# Patient Record
Sex: FEMALE | Race: WHITE | Employment: OTHER | URBAN - NONMETROPOLITAN AREA
[De-identification: names, ages, dates, MRNs, and addresses within clinical notes are randomized per-mention and may not be internally consistent; named-entity substitution may affect disease eponyms.]

---

## 2021-02-04 ENCOUNTER — HOSPITAL ENCOUNTER (INPATIENT)
Age: 76
LOS: 3 days | Discharge: HOME | DRG: 617 | End: 2021-02-07
Attending: PODIATRIST | Admitting: PODIATRIST
Payer: MEDICARE

## 2021-02-04 DIAGNOSIS — M79.662 PAIN AND SWELLING OF LEFT LOWER LEG: ICD-10-CM

## 2021-02-04 DIAGNOSIS — M79.89 PAIN AND SWELLING OF LEFT LOWER LEG: ICD-10-CM

## 2021-02-04 LAB
ANION GAP SERPL CALCULATED.3IONS-SCNC: 14 MEQ/L (ref 8–16)
BASOPHILS # BLD: 0.4 %
BASOPHILS ABSOLUTE: 0 THOU/MM3 (ref 0–0.1)
BUN BLDV-MCNC: 32 MG/DL (ref 7–22)
CALCIUM SERPL-MCNC: 8.9 MG/DL (ref 8.5–10.5)
CHLORIDE BLD-SCNC: 105 MEQ/L (ref 98–111)
CO2: 21 MEQ/L (ref 23–33)
CREAT SERPL-MCNC: 1.9 MG/DL (ref 0.4–1.2)
EOSINOPHIL # BLD: 4.7 %
EOSINOPHILS ABSOLUTE: 0.4 THOU/MM3 (ref 0–0.4)
ERYTHROCYTE [DISTWIDTH] IN BLOOD BY AUTOMATED COUNT: 13.8 % (ref 11.5–14.5)
ERYTHROCYTE [DISTWIDTH] IN BLOOD BY AUTOMATED COUNT: 47 FL (ref 35–45)
GFR SERPL CREATININE-BSD FRML MDRD: 26 ML/MIN/1.73M2
GLUCOSE BLD-MCNC: 114 MG/DL (ref 70–108)
HCT VFR BLD CALC: 34.9 % (ref 37–47)
HEMOGLOBIN: 10.5 GM/DL (ref 12–16)
IMMATURE GRANS (ABS): 0.02 THOU/MM3 (ref 0–0.07)
IMMATURE GRANULOCYTES: 0.2 %
LYMPHOCYTES # BLD: 27.1 %
LYMPHOCYTES ABSOLUTE: 2.4 THOU/MM3 (ref 1–4.8)
MCH RBC QN AUTO: 28.3 PG (ref 26–33)
MCHC RBC AUTO-ENTMCNC: 30.1 GM/DL (ref 32.2–35.5)
MCV RBC AUTO: 94.1 FL (ref 81–99)
MONOCYTES # BLD: 5.8 %
MONOCYTES ABSOLUTE: 0.5 THOU/MM3 (ref 0.4–1.3)
NUCLEATED RED BLOOD CELLS: 0 /100 WBC
PLATELET # BLD: 301 THOU/MM3 (ref 130–400)
PMV BLD AUTO: 10.9 FL (ref 9.4–12.4)
POTASSIUM REFLEX MAGNESIUM: 3.9 MEQ/L (ref 3.5–5.2)
RBC # BLD: 3.71 MILL/MM3 (ref 4.2–5.4)
SEG NEUTROPHILS: 61.8 %
SEGMENTED NEUTROPHILS ABSOLUTE COUNT: 5.5 THOU/MM3 (ref 1.8–7.7)
SODIUM BLD-SCNC: 140 MEQ/L (ref 135–145)
WBC # BLD: 8.9 THOU/MM3 (ref 4.8–10.8)

## 2021-02-04 PROCEDURE — 87205 SMEAR GRAM STAIN: CPT

## 2021-02-04 PROCEDURE — 36415 COLL VENOUS BLD VENIPUNCTURE: CPT

## 2021-02-04 PROCEDURE — 87070 CULTURE OTHR SPECIMN AEROBIC: CPT

## 2021-02-04 PROCEDURE — 83036 HEMOGLOBIN GLYCOSYLATED A1C: CPT

## 2021-02-04 PROCEDURE — 87075 CULTR BACTERIA EXCEPT BLOOD: CPT

## 2021-02-04 PROCEDURE — 80048 BASIC METABOLIC PNL TOTAL CA: CPT

## 2021-02-04 PROCEDURE — 85025 COMPLETE CBC W/AUTO DIFF WBC: CPT

## 2021-02-04 PROCEDURE — 1200000000 HC SEMI PRIVATE

## 2021-02-04 PROCEDURE — 87147 CULTURE TYPE IMMUNOLOGIC: CPT

## 2021-02-04 RX ORDER — OXYCODONE AND ACETAMINOPHEN 7.5; 325 MG/1; MG/1
1 TABLET ORAL 2 TIMES DAILY
COMMUNITY

## 2021-02-04 RX ORDER — DULOXETIN HYDROCHLORIDE 30 MG/1
30 CAPSULE, DELAYED RELEASE ORAL DAILY
COMMUNITY

## 2021-02-04 RX ORDER — POTASSIUM CHLORIDE 750 MG/1
10 CAPSULE, EXTENDED RELEASE ORAL DAILY
COMMUNITY

## 2021-02-04 RX ORDER — LANOLIN ALCOHOL/MO/W.PET/CERES
1000 CREAM (GRAM) TOPICAL DAILY
COMMUNITY

## 2021-02-04 RX ORDER — NICOTINE POLACRILEX 4 MG
15 LOZENGE BUCCAL PRN
Status: DISCONTINUED | OUTPATIENT
Start: 2021-02-04 | End: 2021-02-07 | Stop reason: HOSPADM

## 2021-02-04 RX ORDER — DEXTROSE MONOHYDRATE 50 MG/ML
100 INJECTION, SOLUTION INTRAVENOUS PRN
Status: DISCONTINUED | OUTPATIENT
Start: 2021-02-04 | End: 2021-02-07 | Stop reason: HOSPADM

## 2021-02-04 RX ORDER — GABAPENTIN 400 MG/1
400 CAPSULE ORAL 3 TIMES DAILY
COMMUNITY

## 2021-02-04 RX ORDER — GLIPIZIDE 5 MG/1
5 TABLET ORAL DAILY
COMMUNITY

## 2021-02-04 RX ORDER — POLYETHYLENE GLYCOL 3350 17 G/17G
17 POWDER, FOR SOLUTION ORAL DAILY PRN
Status: DISCONTINUED | OUTPATIENT
Start: 2021-02-04 | End: 2021-02-07 | Stop reason: HOSPADM

## 2021-02-04 RX ORDER — BENAZEPRIL HYDROCHLORIDE 20 MG/1
20 TABLET ORAL DAILY
COMMUNITY

## 2021-02-04 RX ORDER — HYDROXYZINE HYDROCHLORIDE 25 MG/1
25 TABLET, FILM COATED ORAL 3 TIMES DAILY PRN
COMMUNITY

## 2021-02-04 RX ORDER — SODIUM CHLORIDE 0.9 % (FLUSH) 0.9 %
10 SYRINGE (ML) INJECTION PRN
Status: DISCONTINUED | OUTPATIENT
Start: 2021-02-04 | End: 2021-02-07 | Stop reason: HOSPADM

## 2021-02-04 RX ORDER — SIMVASTATIN 40 MG
40 TABLET ORAL NIGHTLY
COMMUNITY

## 2021-02-04 RX ORDER — BUMETANIDE 1 MG/1
1 TABLET ORAL DAILY
COMMUNITY

## 2021-02-04 RX ORDER — ACETAMINOPHEN 650 MG/1
650 SUPPOSITORY RECTAL EVERY 6 HOURS PRN
Status: DISCONTINUED | OUTPATIENT
Start: 2021-02-04 | End: 2021-02-07 | Stop reason: HOSPADM

## 2021-02-04 RX ORDER — OMEPRAZOLE 40 MG/1
40 CAPSULE, DELAYED RELEASE ORAL DAILY
COMMUNITY

## 2021-02-04 RX ORDER — SODIUM CHLORIDE 0.9 % (FLUSH) 0.9 %
10 SYRINGE (ML) INJECTION EVERY 12 HOURS SCHEDULED
Status: DISCONTINUED | OUTPATIENT
Start: 2021-02-04 | End: 2021-02-07 | Stop reason: HOSPADM

## 2021-02-04 RX ORDER — ONDANSETRON 2 MG/ML
4 INJECTION INTRAMUSCULAR; INTRAVENOUS EVERY 6 HOURS PRN
Status: DISCONTINUED | OUTPATIENT
Start: 2021-02-04 | End: 2021-02-07 | Stop reason: HOSPADM

## 2021-02-04 RX ORDER — SODIUM CHLORIDE 9 MG/ML
INJECTION, SOLUTION INTRAVENOUS CONTINUOUS
Status: DISCONTINUED | OUTPATIENT
Start: 2021-02-04 | End: 2021-02-06

## 2021-02-04 RX ORDER — PROMETHAZINE HYDROCHLORIDE 25 MG/1
12.5 TABLET ORAL EVERY 6 HOURS PRN
Status: DISCONTINUED | OUTPATIENT
Start: 2021-02-04 | End: 2021-02-07 | Stop reason: HOSPADM

## 2021-02-04 RX ORDER — ALPRAZOLAM 0.25 MG/1
0.25 TABLET ORAL NIGHTLY PRN
COMMUNITY

## 2021-02-04 RX ORDER — ACETAMINOPHEN 325 MG/1
650 TABLET ORAL EVERY 6 HOURS PRN
Status: DISCONTINUED | OUTPATIENT
Start: 2021-02-04 | End: 2021-02-07 | Stop reason: HOSPADM

## 2021-02-04 RX ORDER — DEXTROSE MONOHYDRATE 25 G/50ML
12.5 INJECTION, SOLUTION INTRAVENOUS PRN
Status: DISCONTINUED | OUTPATIENT
Start: 2021-02-04 | End: 2021-02-07 | Stop reason: HOSPADM

## 2021-02-04 RX ORDER — CHOLECALCIFEROL (VITAMIN D3) 1250 MCG
CAPSULE ORAL
COMMUNITY

## 2021-02-04 SDOH — HEALTH STABILITY: MENTAL HEALTH: HOW OFTEN DO YOU HAVE A DRINK CONTAINING ALCOHOL?: NEVER

## 2021-02-05 ENCOUNTER — APPOINTMENT (OUTPATIENT)
Dept: INTERVENTIONAL RADIOLOGY/VASCULAR | Age: 76
DRG: 617 | End: 2021-02-05
Attending: PODIATRIST
Payer: MEDICARE

## 2021-02-05 ENCOUNTER — APPOINTMENT (OUTPATIENT)
Dept: GENERAL RADIOLOGY | Age: 76
DRG: 617 | End: 2021-02-05
Attending: PODIATRIST
Payer: MEDICARE

## 2021-02-05 ENCOUNTER — ANESTHESIA (OUTPATIENT)
Dept: OPERATING ROOM | Age: 76
DRG: 617 | End: 2021-02-05
Payer: MEDICARE

## 2021-02-05 ENCOUNTER — ANESTHESIA EVENT (OUTPATIENT)
Dept: OPERATING ROOM | Age: 76
DRG: 617 | End: 2021-02-05
Payer: MEDICARE

## 2021-02-05 VITALS — OXYGEN SATURATION: 98 % | SYSTOLIC BLOOD PRESSURE: 90 MMHG | DIASTOLIC BLOOD PRESSURE: 53 MMHG

## 2021-02-05 PROBLEM — L03.90 CELLULITIS: Status: ACTIVE | Noted: 2021-02-05

## 2021-02-05 LAB
ANION GAP SERPL CALCULATED.3IONS-SCNC: 13 MEQ/L (ref 8–16)
AVERAGE GLUCOSE: 105 MG/DL (ref 70–126)
BUN BLDV-MCNC: 33 MG/DL (ref 7–22)
CALCIUM SERPL-MCNC: 8.8 MG/DL (ref 8.5–10.5)
CHLORIDE BLD-SCNC: 106 MEQ/L (ref 98–111)
CO2: 24 MEQ/L (ref 23–33)
CREAT SERPL-MCNC: 2 MG/DL (ref 0.4–1.2)
EKG ATRIAL RATE: 77 BPM
EKG P AXIS: 72 DEGREES
EKG P-R INTERVAL: 170 MS
EKG Q-T INTERVAL: 378 MS
EKG QRS DURATION: 78 MS
EKG QTC CALCULATION (BAZETT): 427 MS
EKG R AXIS: 58 DEGREES
EKG T AXIS: 64 DEGREES
EKG VENTRICULAR RATE: 77 BPM
GFR SERPL CREATININE-BSD FRML MDRD: 24 ML/MIN/1.73M2
GLUCOSE BLD-MCNC: 104 MG/DL (ref 70–108)
GLUCOSE BLD-MCNC: 105 MG/DL (ref 70–108)
GLUCOSE BLD-MCNC: 107 MG/DL (ref 70–108)
GLUCOSE BLD-MCNC: 148 MG/DL (ref 70–108)
GLUCOSE BLD-MCNC: 166 MG/DL (ref 70–108)
HBA1C MFR BLD: 5.5 % (ref 4.4–6.4)
IRON: 29 UG/DL (ref 50–170)
POTASSIUM REFLEX MAGNESIUM: 4.1 MEQ/L (ref 3.5–5.2)
SARS-COV-2, NAAT: NOT DETECTED
SODIUM BLD-SCNC: 143 MEQ/L (ref 135–145)

## 2021-02-05 PROCEDURE — 83540 ASSAY OF IRON: CPT

## 2021-02-05 PROCEDURE — 0Y6S0Z0 DETACHMENT AT LEFT 2ND TOE, COMPLETE, OPEN APPROACH: ICD-10-PCS | Performed by: PODIATRIST

## 2021-02-05 PROCEDURE — 2500000003 HC RX 250 WO HCPCS: Performed by: NURSE ANESTHETIST, CERTIFIED REGISTERED

## 2021-02-05 PROCEDURE — 6360000002 HC RX W HCPCS: Performed by: STUDENT IN AN ORGANIZED HEALTH CARE EDUCATION/TRAINING PROGRAM

## 2021-02-05 PROCEDURE — 2580000003 HC RX 258: Performed by: STUDENT IN AN ORGANIZED HEALTH CARE EDUCATION/TRAINING PROGRAM

## 2021-02-05 PROCEDURE — 2720000010 HC SURG SUPPLY STERILE: Performed by: PODIATRIST

## 2021-02-05 PROCEDURE — 1200000000 HC SEMI PRIVATE

## 2021-02-05 PROCEDURE — 36415 COLL VENOUS BLD VENIPUNCTURE: CPT

## 2021-02-05 PROCEDURE — 71046 X-RAY EXAM CHEST 2 VIEWS: CPT

## 2021-02-05 PROCEDURE — 6360000002 HC RX W HCPCS: Performed by: NURSE ANESTHETIST, CERTIFIED REGISTERED

## 2021-02-05 PROCEDURE — U0002 COVID-19 LAB TEST NON-CDC: HCPCS

## 2021-02-05 PROCEDURE — 6370000000 HC RX 637 (ALT 250 FOR IP): Performed by: STUDENT IN AN ORGANIZED HEALTH CARE EDUCATION/TRAINING PROGRAM

## 2021-02-05 PROCEDURE — 3700000000 HC ANESTHESIA ATTENDED CARE: Performed by: PODIATRIST

## 2021-02-05 PROCEDURE — 2500000003 HC RX 250 WO HCPCS: Performed by: PODIATRIST

## 2021-02-05 PROCEDURE — 82948 REAGENT STRIP/BLOOD GLUCOSE: CPT

## 2021-02-05 PROCEDURE — 93971 EXTREMITY STUDY: CPT

## 2021-02-05 PROCEDURE — 3700000001 HC ADD 15 MINUTES (ANESTHESIA): Performed by: PODIATRIST

## 2021-02-05 PROCEDURE — 2709999900 HC NON-CHARGEABLE SUPPLY: Performed by: PODIATRIST

## 2021-02-05 PROCEDURE — 3600000013 HC SURGERY LEVEL 3 ADDTL 15MIN: Performed by: PODIATRIST

## 2021-02-05 PROCEDURE — 87147 CULTURE TYPE IMMUNOLOGIC: CPT

## 2021-02-05 PROCEDURE — 93005 ELECTROCARDIOGRAM TRACING: CPT | Performed by: STUDENT IN AN ORGANIZED HEALTH CARE EDUCATION/TRAINING PROGRAM

## 2021-02-05 PROCEDURE — 3600000003 HC SURGERY LEVEL 3 BASE: Performed by: PODIATRIST

## 2021-02-05 PROCEDURE — 87070 CULTURE OTHR SPECIMN AEROBIC: CPT

## 2021-02-05 PROCEDURE — 80048 BASIC METABOLIC PNL TOTAL CA: CPT

## 2021-02-05 PROCEDURE — 99221 1ST HOSP IP/OBS SF/LOW 40: CPT | Performed by: INTERNAL MEDICINE

## 2021-02-05 PROCEDURE — 93010 ELECTROCARDIOGRAM REPORT: CPT | Performed by: NUCLEAR MEDICINE

## 2021-02-05 PROCEDURE — 87075 CULTR BACTERIA EXCEPT BLOOD: CPT

## 2021-02-05 PROCEDURE — 87205 SMEAR GRAM STAIN: CPT

## 2021-02-05 RX ORDER — BUPIVACAINE HYDROCHLORIDE 5 MG/ML
INJECTION, SOLUTION EPIDURAL; INTRACAUDAL PRN
Status: DISCONTINUED | OUTPATIENT
Start: 2021-02-05 | End: 2021-02-05 | Stop reason: HOSPADM

## 2021-02-05 RX ORDER — SODIUM CHLORIDE 0.9 % (FLUSH) 0.9 %
10 SYRINGE (ML) INJECTION EVERY 12 HOURS SCHEDULED
Status: DISCONTINUED | OUTPATIENT
Start: 2021-02-05 | End: 2021-02-07 | Stop reason: HOSPADM

## 2021-02-05 RX ORDER — LIDOCAINE HYDROCHLORIDE 20 MG/ML
INJECTION, SOLUTION INFILTRATION; PERINEURAL PRN
Status: DISCONTINUED | OUTPATIENT
Start: 2021-02-05 | End: 2021-02-05 | Stop reason: SDUPTHER

## 2021-02-05 RX ORDER — OXYCODONE HYDROCHLORIDE 5 MG/1
5 TABLET ORAL EVERY 4 HOURS PRN
Status: DISCONTINUED | OUTPATIENT
Start: 2021-02-05 | End: 2021-02-07 | Stop reason: HOSPADM

## 2021-02-05 RX ORDER — SODIUM CHLORIDE 0.9 % (FLUSH) 0.9 %
10 SYRINGE (ML) INJECTION PRN
Status: DISCONTINUED | OUTPATIENT
Start: 2021-02-05 | End: 2021-02-07 | Stop reason: HOSPADM

## 2021-02-05 RX ORDER — LISINOPRIL 20 MG/1
20 TABLET ORAL DAILY
Status: DISCONTINUED | OUTPATIENT
Start: 2021-02-05 | End: 2021-02-07 | Stop reason: HOSPADM

## 2021-02-05 RX ORDER — PROPOFOL 10 MG/ML
INJECTION, EMULSION INTRAVENOUS PRN
Status: DISCONTINUED | OUTPATIENT
Start: 2021-02-05 | End: 2021-02-05 | Stop reason: SDUPTHER

## 2021-02-05 RX ORDER — SODIUM CHLORIDE 9 MG/ML
INJECTION, SOLUTION INTRAVENOUS CONTINUOUS
Status: DISCONTINUED | OUTPATIENT
Start: 2021-02-05 | End: 2021-02-06

## 2021-02-05 RX ORDER — OXYCODONE HYDROCHLORIDE 5 MG/1
10 TABLET ORAL EVERY 4 HOURS PRN
Status: DISCONTINUED | OUTPATIENT
Start: 2021-02-05 | End: 2021-02-07 | Stop reason: HOSPADM

## 2021-02-05 RX ORDER — PANTOPRAZOLE SODIUM 40 MG/1
40 TABLET, DELAYED RELEASE ORAL
Status: DISCONTINUED | OUTPATIENT
Start: 2021-02-06 | End: 2021-02-07 | Stop reason: HOSPADM

## 2021-02-05 RX ORDER — ATORVASTATIN CALCIUM 20 MG/1
20 TABLET, FILM COATED ORAL DAILY
Status: DISCONTINUED | OUTPATIENT
Start: 2021-02-05 | End: 2021-02-07 | Stop reason: HOSPADM

## 2021-02-05 RX ORDER — ALPRAZOLAM 0.25 MG/1
0.25 TABLET ORAL NIGHTLY PRN
Status: DISCONTINUED | OUTPATIENT
Start: 2021-02-05 | End: 2021-02-07 | Stop reason: HOSPADM

## 2021-02-05 RX ORDER — OXYCODONE AND ACETAMINOPHEN 7.5; 325 MG/1; MG/1
1 TABLET ORAL 2 TIMES DAILY
Status: DISCONTINUED | OUTPATIENT
Start: 2021-02-05 | End: 2021-02-07 | Stop reason: HOSPADM

## 2021-02-05 RX ORDER — FENTANYL CITRATE 50 UG/ML
INJECTION, SOLUTION INTRAMUSCULAR; INTRAVENOUS PRN
Status: DISCONTINUED | OUTPATIENT
Start: 2021-02-05 | End: 2021-02-05 | Stop reason: SDUPTHER

## 2021-02-05 RX ADMIN — LIDOCAINE HYDROCHLORIDE 40 MG: 20 INJECTION, SOLUTION INFILTRATION; PERINEURAL at 13:42

## 2021-02-05 RX ADMIN — OXYCODONE HYDROCHLORIDE AND ACETAMINOPHEN 1 TABLET: 7.5; 325 TABLET ORAL at 21:17

## 2021-02-05 RX ADMIN — BISACODYL 5 MG: 5 TABLET, COATED ORAL at 17:49

## 2021-02-05 RX ADMIN — SODIUM CHLORIDE: 9 INJECTION, SOLUTION INTRAVENOUS at 17:57

## 2021-02-05 RX ADMIN — PROPOFOL 10 MG: 10 INJECTION, EMULSION INTRAVENOUS at 13:49

## 2021-02-05 RX ADMIN — FENTANYL CITRATE 25 MCG: 50 INJECTION, SOLUTION INTRAMUSCULAR; INTRAVENOUS at 13:30

## 2021-02-05 RX ADMIN — PIPERACILLIN AND TAZOBACTAM 3375 MG: 3; .375 INJECTION, POWDER, LYOPHILIZED, FOR SOLUTION INTRAVENOUS at 17:49

## 2021-02-05 RX ADMIN — PROPOFOL 20 MG: 10 INJECTION, EMULSION INTRAVENOUS at 13:42

## 2021-02-05 RX ADMIN — PIPERACILLIN AND TAZOBACTAM 3375 MG: 3; .375 INJECTION, POWDER, LYOPHILIZED, FOR SOLUTION INTRAVENOUS at 09:31

## 2021-02-05 RX ADMIN — ALPRAZOLAM 0.25 MG: 0.25 TABLET ORAL at 21:17

## 2021-02-05 RX ADMIN — SODIUM CHLORIDE: 9 INJECTION, SOLUTION INTRAVENOUS at 13:30

## 2021-02-05 RX ADMIN — POLYETHYLENE GLYCOL 3350 17 G: 17 POWDER, FOR SOLUTION ORAL at 18:07

## 2021-02-05 RX ADMIN — SODIUM CHLORIDE: 9 INJECTION, SOLUTION INTRAVENOUS at 01:45

## 2021-02-05 RX ADMIN — ONDANSETRON HYDROCHLORIDE 4 MG: 4 INJECTION, SOLUTION INTRAMUSCULAR; INTRAVENOUS at 13:55

## 2021-02-05 RX ADMIN — PIPERACILLIN AND TAZOBACTAM 3375 MG: 3; .375 INJECTION, POWDER, LYOPHILIZED, FOR SOLUTION INTRAVENOUS at 01:41

## 2021-02-05 ASSESSMENT — PULMONARY FUNCTION TESTS
PIF_VALUE: 0
PIF_VALUE: 1
PIF_VALUE: 0

## 2021-02-05 ASSESSMENT — PAIN SCALES - GENERAL: PAINLEVEL_OUTOF10: 0

## 2021-02-05 NOTE — FLOWSHEET NOTE
Initial Spiritual Care Contact:      02/05/21 1510   Encounter Summary   Services provided to: Patient   Referral/Consult From: Sriram   Continue Visiting Yes  (2/5)   Complexity of Encounter Low   Length of Encounter 15 minutes   Spiritual/Mormonism   Type Spiritual support     Had pleasant conversation and prayer. Will have her toe amputated on Sunday at 2:00. Care Plan:  Continue spiritual and emotional care for patient and family. Including prayers.

## 2021-02-05 NOTE — CARE COORDINATION
DISASTER CHARTING    21, 11:53 AM EST    DISCHARGE ONGOING EVALUATION:     [facility] day: 1  Location: --A Reason for admit: Cellulitis [L03.90] Left foot second toe ulcer with osteomyelites and cellulites  Barriers to Discharge: Plan: Second digit amputation, left foot to 2021 in OR under MAC sedation pending surgical clearance  N. p.o. at midnight  Verify informed consent  Hold anticoagulants  Patient may weight-bear as tolerated in surgical shoe  Consult placed to infectious disease for antibiotic therapy, starting IV Zosyn  Aerobic and anaerobic culture taken, sent to micro  Consult placed to hospitalist for preoperative or stratification and medical management - cleared  PT and OT consult placed for gait training  Consult placed to case management for discharge planning  Podiatry will continue to follow this patient  Dr Paco Palomino noted plan for Continue IV Zosyn and transition to oral antibiotics. PCP: Angelia Espinoza  Readmission Risk Score: 11%  Patient Goals/Plan/Treatment Preferences: I spoke with Kana Martinez. She is planning to go home with at discharge. Her   a month ago. She has a dog. She has needed equipment for home use. She would be open to [facility] if ordered. She would like information to order home delivered meals - SW consulted.

## 2021-02-05 NOTE — CARE COORDINATION
2/5/21, 3:49 PM EST    DISCHARGE PLANNING EVALUATION      Patient in surgery today. She is requesting information on home delivered meals. Information and contact numbers for meals on wheels program in [address] [place] [address] left in patient's room for patient to follow up.

## 2021-02-05 NOTE — H&P
Department of Podiatric Surgery  History and Physical        CHIEF COMPLAINT: Left second toe infection    Reason for Admission: Left second toe infection, preop    History Obtained From:  patient, family member -children    HISTORY OF PRESENT ILLNESS:      The patient is a 67 y.o. female with significant past medical history of diabetes, hypertension, CKD who was seen by Dr. Dalila Cantu today in clinic. Patient is accompanied by her children. Patient relates that she has had a wound on her second toe for approximately 1 month. She has been neuropathic for a very long time. She denies any fever, chills, nausea, vomiting, chest pain or shortness of breath. Patient does have some discomfort to the left leg. She denies being on any blood thinners but does sit often. They had discussed with Dr. Dalila Cantu in clinic that she has infection in her bone and the best course of action would be a digit amputation under MAC sedation. Patient and family all agree to the treatment. Past Medical History:        Diagnosis Date    Arthritis     Blood circulation, collateral     Legs    Chronic kidney disease     Diabetes mellitus [address] [address]     Hypertension      Past Surgical History:        Procedure Laterality Date    JOINT REPLACEMENT       Immunizations:              Tetanus:  unknown              Medications Prior to Admission:   Medications Prior to Admission: potassium chloride (MICRO-K) 10 MEQ extended release capsule, Take 10 mEq by mouth daily  bumetanide (BUMEX) 1 MG tablet, Take 1 mg by mouth daily  benazepril (LOTENSIN) 20 MG tablet, Take 20 mg by mouth daily  gabapentin (NEURONTIN) 400 MG capsule, Take 400 mg by mouth 3 times daily. glipiZIDE (GLUCOTROL) 5 MG tablet, Take 5 mg by mouth daily Takes at lunch  metFORMIN (GLUCOPHAGE) 500 MG tablet, Take 500 mg by mouth daily Takes at lunch  ALPRAZolam (XANAX) 0.25 MG tablet, Take 0.25 mg by mouth nightly as needed for Sleep. simvastatin (ZOCOR) 40 MG tablet, Take 40 mg by mouth nightly  omeprazole (PRILOSEC) 40 MG delayed release capsule, Take 40 mg by mouth daily  oxyCODONE-acetaminophen (PERCOCET) 7.5-325 MG per tablet, Take 1 tablet by mouth 2 times daily. DULoxetine (CYMBALTA) 30 MG extended release capsule, Take 30 mg by mouth daily  hydrOXYzine (ATARAX) 25 MG tablet, Take 25 mg by mouth 3 times daily as needed for Itching (Sleep)  vitamin B-12 (CYANOCOBALAMIN) 1000 MCG tablet, Take 1,000 mcg by mouth daily  Cholecalciferol (VITAMIN D3) 1.25 MG (93126 UT) CAPS, Take by mouth    Allergies:  Patient has no known allergies. Social History:   TOBACCO:   reports that she has never smoked. She has never used smokeless tobacco.  ETOH:   reports no history of alcohol use. Family History:   History reviewed. No pertinent family history. REVIEW OF SYSTEMS:  CONSTITUTIONAL:  negative    PHYSICAL EXAM:  VITALS:  BP (!) 127/59   Pulse 84   Temp 98.6 °F (37 °C) (Oral)   Resp 16   Ht 5' 5\" (1.651 m)   Wt 198 lb (89.8 kg)   SpO2 97%   BMI 32.95 kg/m²   CONSTITUTIONAL:  awake, alert, cooperative, no apparent distress, and appears stated age  EYES:  pupils equal, round and reactive to light, extra ocular muscles intact, sclera clear, conjunctiva normal  ENT:  normocepalic, without obvious abnormality  LUNGS:  No increased work of breathing, good air exchange, clear to auscultation bilaterally, no crackles or wheezing  CARDIOVASCULAR:  Normal apical impulse, regular rate and rhythm      LOWER EXTREMITY:  Physical Exam:   Vascular: Dorsalis pedis and posterior tibial pulses are very easily palpable bilaterally. Skin temperature is warm to warm from proximal tibial tuberosity to distal digits left greater than right. CFT brisk to exposed digits. Edema present and nonpitting. Hair growth absent.  Quality of skin within normal limits Dermatologic: Nails 1-5 bilaterally are thickened, elongated and dystrophic, with presence of subungual debris. Webspaces 1-4 bilaterally are clean, dry and intact. Hyperkeratotic wounds noted to the distal tip of the second digit of the left foot. There is a small ulceration in this area that has a fibrogranular base that probes to bone. There is serous drainage noted. No malodor. There is diffuse erythema and dactylitis noted to the digit. Arnulfo Smith Neurovascular: Epicritic and protopathic sensation diminished bilateral.    Musculoskeletal: Muscle strength 5/5 for all plantarflexors, dorsiflexors, inverters and everters examined. Negative pain on palpation to second digit. Second digit is hammered to the left side with distal clavus. IMAGING:  X-rays reviewed in office. LABS: Pending    ASSESSMENT AND PLAN:  1. Diabetic wound with osteomyelitis, second digit left foot  -Patient was initially examined and evaluated  -Ordered PA T's: CBC, BMP, hemoglobin A1c, chest x-ray, EKG  Discussed with patient and family members at length about surgical procedure for second digit amputation of the left foot. Patient understands all risks and benefits associated with the procedure including but not limited to dehiscence, further infection, more proximal amputation. Patient understands and wishes to proceed. N.p.o. at midnight  Verify informed consent  Hold anticoagulants  Patient may weight-bear as tolerated in surgical shoe  Consult placed to infectious disease for antibiotic therapy, starting IV Zosyn  Aerobic and anaerobic culture taken, sent to micro  Consult placed to hospitalist for preoperative or stratification and medical management  PT and OT consult placed for gait training  Consult placed to case management for discharge planning  Podiatry will continue to follow this patient    2.   Diabetes mellitus  Continue home medications  Ordered hemoglobin A1c  Consult placed to hospitalist

## 2021-02-05 NOTE — PLAN OF CARE
Problem: Falls - Risk of:  Goal: Will remain free from falls  Description: Will remain free from falls  Outcome: Ongoing  Note: Patient using call light appropriately to call for assistance. Patient is compliant with use of non-skid slippers. Patient reports understanding of fall prevention when discussed. Bed alarm remains in place. Call light is in reach. Goal: Absence of physical injury  Description: Absence of physical injury  Outcome: Ongoing  Note: Patient remains free from physical injury at this time. Problem: Discharge Planning:  Goal: Discharged to appropriate level of care  Description: Discharged to appropriate level of care  Outcome: Ongoing  Note: Patient plans to return home at discharge.  consulted for discharge planning. Problem: Pain:  Goal: Pain level will decrease  Description: Pain level will decrease  Outcome: Ongoing  Note: Patient denies any pain at this time. Will continue to monitor. Goal: Control of acute pain  Description: Control of acute pain  Outcome: Ongoing  Goal: Control of chronic pain  Description: Control of chronic pain  Outcome: Ongoing    Care plan reviewed with patient and family. Patient and family verbalize understanding of the plan of care and contribute to goal setting.

## 2021-02-05 NOTE — ANESTHESIA POSTPROCEDURE EVALUATION
Department of Anesthesiology  Postprocedure Note    Patient: Aguilar Rosa  MRN: [id number]  YOB: 1945  Date of evaluation: 2/5/2021  Time:  2:40 PM     Procedure Summary     Date: 02/05/21 Room / [facility]: [facility]  / [facility]    Anesthesia Start: 1330 Anesthesia Stop: 9601    Procedure: LEFT SECOND TOE AMPUTATION (Left Toes) Diagnosis: (Osteomyelitis)    Surgeons: Dwayne Mann, DPLESTER Responsible Provider: Bhavesh Bryan MD    Anesthesia Type: MAC ASA Status: 3          Anesthesia Type: MAC    Erica Phase I:      Erica Phase II:      Last vitals: Reviewed and per EMR flowsheets.        Anesthesia Post Evaluation    Patient location during evaluation: bedside  Patient participation: complete - patient participated  Level of consciousness: awake  Airway patency: patent  Nausea & Vomiting: no vomiting and no nausea  Complications: no  Cardiovascular status: hemodynamically stable  Respiratory status: acceptable  Hydration status: stable

## 2021-02-05 NOTE — PROGRESS NOTES
[facility]  PHYSICAL THERAPY MISSED TREATMENT NOTE  [facility] ORTHOPEDICS 7K    Date: 2021  Patient Name: Petra Bueno        MRN: [id number]   : 1945  (76 y.o.)  Gender: female                REASON FOR MISSED TREATMENT:  Pt is scheduled for L 2nd toe amputation this afternoon. Will hold PT until after surgery.  Lisy Drafts, PT, DPT

## 2021-02-05 NOTE — CONSULTS
CONSULTATION NOTE :ID       Patient - Veronica Massey,  Age - 67 y.o.    - 1945      Room Number - 7K-13/013-A   MRN -  [id number]   [facility]  Date of Admission -  2021  9:22 PM  Patient's PCP: Ruth Garay     Requesting Physician: Vinh Donahue., DPM    REASON FOR CONSULTATION   Left foot second toe ulcer with cellulites  CHIEF COMPLAINT   Non healing wound    HISTORY OF PRESENT ILLNESS       This is a very pleasant 67 y.o. female who was admitted to the hospital with a chief complaints of non healing left foot second toe ulcer. She is diabetic with neuropathy. Had an ulcer for the last 4 wks. She was seen by her podiatrist and was noted to have an ulcer on the left second toe with redenss. The ulcer probes to the bone and the toe was red swollen. She was admitted for iv antibiotic and surgery. She has no hx of PVD. Has CKD. There was redness on the dorsum of the left foot    PAST MEDICAL  HISTORY       Past Medical History:   Diagnosis Date    Arthritis     Blood circulation, collateral     Legs    Chronic kidney disease     Diabetes mellitus ([address]     Hypertension        PAST SURGICAL HISTORY     Past Surgical History:   Procedure Laterality Date    JOINT REPLACEMENT           MEDICATIONS:       Scheduled Meds:   [Held by provider] lisinopril  20 mg Oral Daily    [START ON 2021] pantoprazole  40 mg Oral QAM AC    atorvastatin  20 mg Oral Daily    oxyCODONE-acetaminophen  1 tablet Oral BID    sodium chloride flush  10 mL Intravenous 2 times per day    piperacillin-tazobactam  3,375 mg Intravenous Q8H     Continuous Infusions:   sodium chloride Stopped (21 0550)    dextrose       PRN Meds:ALPRAZolam, sodium chloride flush, promethazine **OR** ondansetron, polyethylene glycol, acetaminophen **OR** acetaminophen, glucose, dextrose, glucagon (rDNA), dextrose  Allergies:   ALLERGIES:    Patient has no known allergies. SOCIAL HISTORY:     TOBACCO:   reports that she has never smoked. She has never used smokeless tobacco.     ETOH:   reports no history of alcohol use. Patient currently lives alone      FAMILY HISTORY:     History reviewed. No pertinent family history. REVIEW OF SYSTEMS:     Constitutional: no fever, no night sweats, no fatigue, no weight loss. Head: no head ache , no head injury, no migranes. Eye: no eye discharge, blurring of vision, no double vision,no eye pain. Ears: no hearing difficulty, no tinnitus  Mouth/throat: no ulceration, dental caries , dysphagia, no hoarseness and voice change  Respiratory: no cough no chest pain,no shortness of breath,no wheezing  CVS: no palpitation, no chest pain,   GI: no abdominal pain, no nausea , no vomiting, no constipation,no diarrhea. REY: no dysuria, frequency and urgency, no hematuria, no kidney stones  Musculoskeletal: as noted in HPI. Endocrine: no polyuria, polydipsia, no cold or heat intolerance  Hematology: no anemia, no easy brusing or bleeding, no hx of clotting disorder  Dermatology: no skin rash, no skin lesions, no pruritis,  Neurological:no headaches,no dizziness, no seizure, no numbness. Psychiatry: no depression, no anxiety,no panic attacks, no suicide ideation    PHYSICAL EXAM:     BP (!) 107/56   Pulse 67   Temp 97.8 °F (36.6 °C) (Oral)   Resp 16   Ht 5' 5\" (1.651 m)   Wt 198 lb (89.8 kg)   SpO2 97%   BMI 32.95 kg/m²   General apperance:  Awake, alert, not in distress. HEENT: pink conjunctiva, unicteric sclera, moist oral mucosa. Chest:  Bilateral air entry  Cardiovascular:  RRR ,S1S2, no murmur or gallop. Abdomen:  Soft, non tender to palpation. Extremities:  She has open wound on the left second toe with redness and swelling. The pulse palpable, there is redness on the dorsum of the foot  Skin:  Warm and dry. CNS:oriented to person place and time.         LABS:     CBC:   Recent Labs     02/04/21  2300   WBC 8.9   HGB 10.5*    BMP:    Recent Labs     02/04/21  2300 02/05/21  0618    143   K 3.9 4.1    106   CO2 21* 24   BUN 32* 33*   CREATININE 1.9* 2.0*   GLUCOSE 114* 104     Calcium:  Recent Labs     02/05/21  0618   CALCIUM 8.8    Glucose:  Recent Labs     02/05/21  0548   POCGLU 148*     HgbA1C:   Recent Labs     02/04/21  2300   LABA1C 5.5         Problem list of patient      Patient Active Problem List   Diagnosis Code    Cellulitis L03.90    Chronic kidney disease N18.9    Diabetes mellitus ([facility] [address] E11.9    Hypertension I10           Impression and Recommendation:   Left foot second toe ulcer with osteomyelites and cellulites  Continue iv zosyn. Need amputation of the toe. Will transition to oral antibiotic to address the associated cellulites  DM with neuropathy   Thank you Gary Patel, NLOA for allowing me to participate in this patient's care.     Jonnathan Vaughan MD,FACP 2/5/2021 9:36 AM

## 2021-02-05 NOTE — ANESTHESIA PRE PROCEDURE
Department of Anesthesiology  Preprocedure Note       Name:  Lennon Sandhoff   Age:  67 y.o.  :  1945                                          MRN:  [id number]         Date:  2021      Surgeon: Leona Carter):  Tonny Moritz., DPM    Procedure: Procedure(s):  LEFT SECOND TOE AMPUTATION    Medications prior to admission:   Prior to Admission medications    Medication Sig Start Date End Date Taking? Authorizing Provider   potassium chloride (MICRO-K) 10 MEQ extended release capsule Take 10 mEq by mouth daily   Yes Historical Provider, MD   bumetanide (BUMEX) 1 MG tablet Take 1 mg by mouth daily   Yes Historical Provider, MD   benazepril (LOTENSIN) 20 MG tablet Take 20 mg by mouth daily   Yes Historical Provider, MD   gabapentin (NEURONTIN) 400 MG capsule Take 400 mg by mouth 3 times daily. Yes Historical Provider, MD   glipiZIDE (GLUCOTROL) 5 MG tablet Take 5 mg by mouth daily Takes at lunch   Yes Historical Provider, MD   metFORMIN (GLUCOPHAGE) 500 MG tablet Take 500 mg by mouth daily Takes at lunch   Yes Historical Provider, MD   ALPRAZolam (XANAX) 0.25 MG tablet Take 0.25 mg by mouth nightly as needed for Sleep. Yes Historical Provider, MD   simvastatin (ZOCOR) 40 MG tablet Take 40 mg by mouth nightly   Yes Historical Provider, MD   omeprazole (PRILOSEC) 40 MG delayed release capsule Take 40 mg by mouth daily   Yes Historical Provider, MD   oxyCODONE-acetaminophen (PERCOCET) 7.5-325 MG per tablet Take 1 tablet by mouth 2 times daily.     Yes Historical Provider, MD   DULoxetine (CYMBALTA) 30 MG extended release capsule Take 30 mg by mouth daily   Yes Historical Provider, MD   hydrOXYzine (ATARAX) 25 MG tablet Take 25 mg by mouth 3 times daily as needed for Itching (Sleep)    Historical Provider, MD   vitamin B-12 (CYANOCOBALAMIN) 1000 MCG tablet Take 1,000 mcg by mouth daily    Historical Provider, MD   Cholecalciferol (VITAMIN D3) 1.25 MG (48438 UT) CAPS Take by mouth    Historical Provider, MD Current medications:    Current Facility-Administered Medications   Medication Dose Route Frequency Provider Last Rate Last Admin    ALPRAZolam [place]) tablet 0.25 mg  0.25 mg Oral Nightly PRN Holley Krabbe, MD        [Held by provider] lisinopril (PRINIVIL;ZESTRIL) tablet 20 mg  20 mg Oral Daily Holley Krabbe, MD        [place] ON 2/6/2021] pantoprazole (PROTONIX) tablet 40 mg  40 mg Oral QAM AC Holley Krabbe, MD        atorvastatin (LIPITOR) tablet 20 mg  20 mg Oral Daily Holley Krabbe, MD        oxyCODONE-acetaminophen (PERCOCET) 7.5-325 MG per tablet 1 tablet  1 tablet Oral BID Holley Krabbe, MD        0.9 % sodium chloride infusion   Intravenous Continuous Huan Ez DPLESTER   Stopped at 02/05/21 0550    sodium chloride flush 0.9 % injection 10 mL  10 mL Intravenous 2 times per day Huan Ez DPLESTER        sodium chloride flush 0.9 % injection 10 mL  10 mL Intravenous PRN Huan Ez, DPM        promethazine (PHENERGAN) tablet 12.5 mg  12.5 mg Oral Q6H PRN Huan Ez, DPM        Or    ondansetron (ZOFRAN) injection 4 mg  4 mg Intravenous Q6H PRN Huna Belindak, DPM        polyethylene glycol (GLYCOLAX) packet 17 g  17 g Oral Daily PRN Huan Ez, DPM        acetaminophen (TYLENOL) tablet 650 mg  650 mg Oral Q6H PRN Huan Reek, DPM        Or    acetaminophen (TYLENOL) suppository 650 mg  650 mg Rectal Q6H PRN Huan Belindak, DPM        piperacillin-tazobactam (ZOSYN) 3,375 mg in dextrose 5 % 50 mL IVPB extended infusion (mini-bag)  3,375 mg Intravenous Q8H Amelia Franklin Lynch DPM 12.5 mL/hr at 02/05/21 0931 3,375 mg at 02/05/21 0931    glucose (GLUTOSE) 40 % oral gel 15 g  15 g Oral PRN Amelia Spoon Cris, DPM        dextrose 50 % IV solution  12.5 g Intravenous PRN Huan Ez, DPM        glucagon (rDNA) injection 1 mg  1 mg Intramuscular PRN Huan Reek, DPM  dextrose 5 % solution  100 mL/hr Intravenous PRN Leonid Vallejo DPM           Allergies:  No Known Allergies    Problem List:    Patient Active Problem List   Diagnosis Code    Cellulitis L03.90    Chronic kidney disease N18.9    Diabetes mellitus ([facility] [address] E11.9    Hypertension I10       Past Medical History:        Diagnosis Date    Arthritis     Blood circulation, collateral     Legs    Chronic kidney disease     Diabetes mellitus ([facility] [address]     Hypertension        Past Surgical History:        Procedure Laterality Date    JOINT REPLACEMENT         Social History:    Social History     Tobacco Use    Smoking status: Never Smoker    Smokeless tobacco: Never Used   Substance Use Topics    Alcohol use: Never     Frequency: Never                                Counseling given: Not Answered      Vital Signs (Current):   Vitals:    02/04/21 2157 02/05/21 0145 02/05/21 0921   BP: (!) 127/59 (!) 114/58 (!) 107/56   Pulse: 84 87 67   Resp: 16 16 16   Temp: 98.6 °F (37 °C) 98.1 °F (36.7 °C) 97.8 °F (36.6 °C)   TempSrc: Oral Oral Oral   SpO2: 97% 95% 97%   Weight: 198 lb (89.8 kg)     Height: 5' 5\" (1.651 m)                                                BP Readings from Last 3 Encounters:   02/05/21 (!) 107/56   02/05/21 (!) 115/58       NPO Status:                                                                                 BMI:   Wt Readings from Last 3 Encounters:   02/04/21 198 lb (89.8 kg)     Body mass index is 32.95 kg/m².     CBC:   Lab Results   Component Value Date    WBC 8.9 02/04/2021    RBC 3.71 02/04/2021    HGB 10.5 02/04/2021    HCT 34.9 02/04/2021    MCV 94.1 02/04/2021     02/04/2021       CMP:   Lab Results   Component Value Date     02/05/2021    K 4.1 02/05/2021     02/05/2021    CO2 24 02/05/2021    BUN 33 02/05/2021    CREATININE 2.0 02/05/2021    LABGLOM 24 02/05/2021    GLUCOSE 104 02/05/2021    CALCIUM 8.8 02/05/2021       POC Tests:   Recent Labs     02/05/21 1124   POCGLU 107       Coags: No results found for: PROTIME, INR, APTT    HCG (If Applicable): No results found for: PREGTESTUR, PREGSERUM, HCG, HCGQUANT     ABGs: No results found for: PHART, PO2ART, GXF9CFV, EKA9TCH, BEART, D8TNOPVP     Type & Screen (If Applicable):  No results found for: LABABO, LABRH    Drug/Infectious Status (If Applicable):  No results found for: HIV, HEPCAB    COVID-19 Screening (If Applicable):   Lab Results   Component Value Date    COVID19 NOT DETECTED 02/05/2021         Anesthesia Evaluation  Patient summary reviewed and Nursing notes reviewed no history of anesthetic complications:   Airway: Mallampati: III  TM distance: >3 FB   Neck ROM: full  Mouth opening: > = 3 FB Dental:          Pulmonary:Negative Pulmonary ROS and normal exam  breath sounds clear to auscultation                             Cardiovascular:  Exercise tolerance: poor (<4 METS),   (+) hypertension:,       ECG reviewed                        Neuro/Psych:   Negative Neuro/Psych ROS              GI/Hepatic/Renal:   (+) renal disease: CRI,           Endo/Other:    (+) DiabetesType II DM, poorly controlled, , .          Pt had no PAT visit       Abdominal:   (+) obese,     Abdomen: soft. Vascular: negative vascular ROS. Anesthesia Plan      MAC     ASA 3       Induction: intravenous. Anesthetic plan and risks discussed with patient. Plan discussed with CRNA.                   Nubia Castillo DO   2/5/2021

## 2021-02-05 NOTE — CONSULTS
Consult History & Physical      Patient:  Elizabeth Kearney  YOB: 1945    MRN: [id number]     Acct: [de-identified]      Chief Complaint:  Infected toe    Date of Service: Pt seen/examined in consultation on 2/5/2021      History Of Present Illness:      67 y.o. female who we are asked to see/evaluate by Vanetta Gilford.NOLA for medical management of diabetes, hypertension. The pt has a one month hx of a toe which became red and became infected. She saw Dr Alen Bowman in the office and was admitted with the intent of amputation. She has been diabetic x 4 yr. She has had peripheral neuropathy many years. She has recently been advised she has kidney problems and is scheduled to see a nephrologist.  She denies hx of heart problems and denies sob or chest pain. Past Medical History:        Diagnosis Date    Arthritis     Blood circulation, collateral     Legs    Chronic kidney disease     Diabetes mellitus ([facility] [address]     Hypertension        Past Surgical History:        Procedure Laterality Date    JOINT REPLACEMENT         Medications Prior to Admission:    Prior to Admission medications    Medication Sig Start Date End Date Taking? Authorizing Provider   potassium chloride (MICRO-K) 10 MEQ extended release capsule Take 10 mEq by mouth daily   Yes Historical Provider, MD   bumetanide (BUMEX) 1 MG tablet Take 1 mg by mouth daily   Yes Historical Provider, MD   benazepril (LOTENSIN) 20 MG tablet Take 20 mg by mouth daily   Yes Historical Provider, MD   gabapentin (NEURONTIN) 400 MG capsule Take 400 mg by mouth 3 times daily. Yes Historical Provider, MD   glipiZIDE (GLUCOTROL) 5 MG tablet Take 5 mg by mouth daily Takes at lunch   Yes Historical Provider, MD   metFORMIN (GLUCOPHAGE) 500 MG tablet Take 500 mg by mouth daily Takes at lunch   Yes Historical Provider, MD   ALPRAZolam (XANAX) 0.25 MG tablet Take 0.25 mg by mouth nightly as needed for Sleep.    Yes Historical Provider, MD simvastatin (ZOCOR) 40 MG tablet Take 40 mg by mouth nightly   Yes Historical Provider, MD   omeprazole (PRILOSEC) 40 MG delayed release capsule Take 40 mg by mouth daily   Yes Historical Provider, MD   oxyCODONE-acetaminophen (PERCOCET) 7.5-325 MG per tablet Take 1 tablet by mouth 2 times daily. Yes Historical Provider, MD   DULoxetine (CYMBALTA) 30 MG extended release capsule Take 30 mg by mouth daily   Yes Historical Provider, MD   hydrOXYzine (ATARAX) 25 MG tablet Take 25 mg by mouth 3 times daily as needed for Itching (Sleep)    Historical Provider, MD   vitamin B-12 (CYANOCOBALAMIN) 1000 MCG tablet Take 1,000 mcg by mouth daily    Historical Provider, MD   Cholecalciferol (VITAMIN D3) 1.25 MG (90946 UT) CAPS Take by mouth    Historical Provider, MD       Allergies:  Patient has no known allergies. Social History:      The patient currently lives alone. She recently lost her . TOBACCO:   reports that she has never smoked. She has never used smokeless tobacco.  ETOH:   reports no history of alcohol use. Family History:     Positive as follows:    History reviewed. No pertinent family history. Diet:  Diet NPO, After Midnight    REVIEW OF SYSTEMS:   Pertinent positives as noted in the HPI. All other systems reviewed and negative. PHYSICAL EXAM:  BP (!) 114/58   Pulse 87   Temp 98.1 °F (36.7 °C) (Oral)   Resp 16   Ht 5' 5\" (1.651 m)   Wt 198 lb (89.8 kg)   SpO2 95%   BMI 32.95 kg/m²   General appearance: No apparent distress, appears stated age and cooperative. HEENT: Normal cephalic, atraumatic without obvious deformity. Pupils equal, round, and reactive to light. Extra ocular muscles intact. Conjunctivae/corneas clear. Neck: Supple, with full range of motion. No jugular venous distention. Trachea midline. Respiratory:  Normal respiratory effort. Clear to auscultation, bilaterally without Rales/Wheezes/Rhonchi. Cardiovascular: Regular rate and rhythm with normal S1/S2 without murmurs, rubs or gallops. Abdomen: Soft, non-tender, non-distended with normal bowel sounds. Musculoskeletal:left foot wrapped  Skin: Skin color, texture, turgor normal.  No rashes or lesions. Neurologic:  Diminished sensation soles of feet  Psychiatric: Alert and oriented, thought content appropriate, normal insight  Capillary Refill: Brisk,< 3 seconds   Peripheral Pulses: +2 palpable right; left wrapped  Labs:     Recent Labs     02/04/21  2300   WBC 8.9   HGB 10.5*   HCT 34.9*        Recent Labs     02/04/21  2300 02/05/21  0618    143   K 3.9 4.1    106   CO2 21* 24   BUN 32* 33*   CREATININE 1.9* 2.0*   CALCIUM 8.9 8.8     No results for input(s): AST, ALT, BILIDIR, BILITOT, ALKPHOS in the last 72 hours. No results for input(s): INR in the last 72 hours. No results for input(s): Coralyn Whitlock in the last 72 hours. Urinalysis:  No results found for: [employer], BACTERIA, RBCUA, BLOODU, [address]    Radiology: I have reviewed the radiology reports with the following interpretation:     VL DUP LOWER EXTREMITY VENOUS LEFT   Final Result   Normal venous ultrasound. No evidence for acute deep venous thrombosis. **This report has been created using voice recognition software. It may contain minor errors which are inherent in voice recognition technology. **      Final report electronically signed by Dr. Robyn Gonzales on 2/5/2021 8:03 AM      XR CHEST (2 VW)   Final Result   Impression:   1. No acute cardiopulmonary disease.       This document has been electronically signed by: Elva Hodgkins, MD on    02/05/2021 08:27 AM                EKG:  I have reviewed the EKG with the following interpretation:    Nsr, normal    DVT prophylaxis: to be determined    Code Status: Full Code    PT/OT Eval Status: Active and ongoing      ASSESSMENT:    Active Hospital Problems    Diagnosis Date Noted  Cellulitis [L03.90] 02/05/2021    Chronic kidney disease [N18.9]     Diabetes mellitus ([facility] [address] [E11.9]     Hypertension [I10]        PLAN:    1. This pt is stable and is a satisfactory surgical risk for planned procedure. 2. Avoid nephrotoxic agents as much as possible. Thank you for the consultation.     Electronically signed by Aggie Sosa MD on 2/5/2021 at 9:05 AM

## 2021-02-05 NOTE — H&P
[address] [place] [address]                              HISTORY AND PHYSICAL    PATIENT NAME: Anton Askew                     :        1945  MED REC NO:   [id number]                           ROOM:       0013  ACCOUNT NO:   [de-identified]                           ADMIT DATE: 2021  PROVIDER:     Bridget Rosenberg. BART Vuong    HISTORY OF PRESENT ILLNESS:  The patient came into my office in [facility]  yesterday about 3 o'clock with chief complaint of a painful, red,  swollen second toe, left foot. Upon examination, the second toe, left  foot, had an ulcer in the distal end of it which was extremely foul  smelling, followed by a hallux bunion with hallux interphalangeus. The  IM angle was 14 degrees, the hallux abductus angle was 28 degrees, and  hallux interphalangeus angle or otherwise known as HI angle was 24  degrees and that was underlapping the second toe which was elevated and  the third toe was underlapping the second toe as well. So, the second  toe is elevated. It has got an ulcer on the distal end. It is pressing  against the toenail, lateral side of the hallux and that is starting to  become macerated and looking like it is going to become infected and  ulcerate. So upon palpation, the second toe ulcer probes to bone, and  x-rays give high suspicion of osteomyelitis. The patient was advised to  direct admit to [facility] as soon as possible, so that  we could amputate her second toe because otherwise to try to fix the  hallux interphalangeus, hallux abductovalgus, and overall bunion  deformity in order to have some place to put the second toe, which is  already infected, would be a lot more surgery than a 58-year-old woman  can tolerate.   So, on top of that, we would have to put fixation in and  with osteomyelitis already present in the toe, it just was not worth the risk to benefit ratio. So, she is here for an amputation, second toe to  the MPJ and hopefully, we will be able to close that today. Consent has  been explained, signed, and witnessed. The patient is aware of risk,  benefits, and complications. She is on IV antibiotics, but she does  have kidney stage 3 disease and we have to watch the antibiotics that we  give her. INDENTATION:  Consent has been signed for an amputation, second toe. SHANNEN JENSEN D.P.M.    D: 02/05/2021 13:38:41       T: 02/05/2021 15:02:41     [email or web address]  Job#: [id number]     Doc#: [id number]    CC:

## 2021-02-06 PROBLEM — M86.172 ACUTE OSTEOMYELITIS OF TOE OF LEFT FOOT (HCC): Status: ACTIVE | Noted: 2021-02-06

## 2021-02-06 LAB
ANION GAP SERPL CALCULATED.3IONS-SCNC: 6 MEQ/L (ref 8–16)
BUN BLDV-MCNC: 29 MG/DL (ref 7–22)
CALCIUM SERPL-MCNC: 8.1 MG/DL (ref 8.5–10.5)
CHLORIDE BLD-SCNC: 112 MEQ/L (ref 98–111)
CO2: 23 MEQ/L (ref 23–33)
CREAT SERPL-MCNC: 1.7 MG/DL (ref 0.4–1.2)
GFR SERPL CREATININE-BSD FRML MDRD: 29 ML/MIN/1.73M2
GLUCOSE BLD-MCNC: 137 MG/DL (ref 70–108)
GLUCOSE BLD-MCNC: 141 MG/DL (ref 70–108)
GLUCOSE BLD-MCNC: 178 MG/DL (ref 70–108)
GLUCOSE BLD-MCNC: 199 MG/DL (ref 70–108)
POTASSIUM REFLEX MAGNESIUM: 4.1 MEQ/L (ref 3.5–5.2)
POTASSIUM SERPL-SCNC: 4.1 MEQ/L (ref 3.5–5.2)
SODIUM BLD-SCNC: 141 MEQ/L (ref 135–145)

## 2021-02-06 PROCEDURE — 99232 SBSQ HOSP IP/OBS MODERATE 35: CPT | Performed by: INTERNAL MEDICINE

## 2021-02-06 PROCEDURE — 94760 N-INVAS EAR/PLS OXIMETRY 1: CPT

## 2021-02-06 PROCEDURE — 1200000000 HC SEMI PRIVATE

## 2021-02-06 PROCEDURE — 2580000003 HC RX 258: Performed by: STUDENT IN AN ORGANIZED HEALTH CARE EDUCATION/TRAINING PROGRAM

## 2021-02-06 PROCEDURE — 80048 BASIC METABOLIC PNL TOTAL CA: CPT

## 2021-02-06 PROCEDURE — 6370000000 HC RX 637 (ALT 250 FOR IP): Performed by: STUDENT IN AN ORGANIZED HEALTH CARE EDUCATION/TRAINING PROGRAM

## 2021-02-06 PROCEDURE — 82948 REAGENT STRIP/BLOOD GLUCOSE: CPT

## 2021-02-06 PROCEDURE — 36415 COLL VENOUS BLD VENIPUNCTURE: CPT

## 2021-02-06 PROCEDURE — 6360000002 HC RX W HCPCS: Performed by: STUDENT IN AN ORGANIZED HEALTH CARE EDUCATION/TRAINING PROGRAM

## 2021-02-06 RX ADMIN — PIPERACILLIN AND TAZOBACTAM 3375 MG: 3; .375 INJECTION, POWDER, LYOPHILIZED, FOR SOLUTION INTRAVENOUS at 01:20

## 2021-02-06 RX ADMIN — PIPERACILLIN AND TAZOBACTAM 3375 MG: 3; .375 INJECTION, POWDER, LYOPHILIZED, FOR SOLUTION INTRAVENOUS at 16:02

## 2021-02-06 RX ADMIN — SODIUM CHLORIDE: 9 INJECTION, SOLUTION INTRAVENOUS at 02:33

## 2021-02-06 RX ADMIN — SODIUM CHLORIDE, PRESERVATIVE FREE 10 ML: 5 INJECTION INTRAVENOUS at 21:54

## 2021-02-06 RX ADMIN — OXYCODONE HYDROCHLORIDE AND ACETAMINOPHEN 1 TABLET: 7.5; 325 TABLET ORAL at 09:35

## 2021-02-06 RX ADMIN — SODIUM CHLORIDE, PRESERVATIVE FREE 10 ML: 5 INJECTION INTRAVENOUS at 09:37

## 2021-02-06 RX ADMIN — ATORVASTATIN CALCIUM 20 MG: 20 TABLET, FILM COATED ORAL at 09:34

## 2021-02-06 RX ADMIN — PANTOPRAZOLE SODIUM 40 MG: 40 TABLET, DELAYED RELEASE ORAL at 06:06

## 2021-02-06 RX ADMIN — SODIUM CHLORIDE, PRESERVATIVE FREE 10 ML: 5 INJECTION INTRAVENOUS at 10:46

## 2021-02-06 RX ADMIN — BISACODYL 5 MG: 5 TABLET, COATED ORAL at 09:34

## 2021-02-06 RX ADMIN — OXYCODONE HYDROCHLORIDE AND ACETAMINOPHEN 1 TABLET: 7.5; 325 TABLET ORAL at 21:54

## 2021-02-06 RX ADMIN — PIPERACILLIN AND TAZOBACTAM 3375 MG: 3; .375 INJECTION, POWDER, LYOPHILIZED, FOR SOLUTION INTRAVENOUS at 09:45

## 2021-02-06 ASSESSMENT — PAIN DESCRIPTION - LOCATION: LOCATION: GENERALIZED

## 2021-02-06 ASSESSMENT — PAIN DESCRIPTION - FREQUENCY: FREQUENCY: INTERMITTENT

## 2021-02-06 ASSESSMENT — PAIN SCALES - GENERAL: PAINLEVEL_OUTOF10: 0

## 2021-02-06 ASSESSMENT — PAIN DESCRIPTION - DESCRIPTORS: DESCRIPTORS: ACHING

## 2021-02-06 NOTE — PROGRESS NOTES
Progress note: Infectious diseases    Patient - Consuelo Woodson,  Age - 67 y.o.    - 1945      Room Number - 7K-13/013-A   MRN -  [id number]   Acct # - [de-identified]  Date of Admission -  2021  9:22 PM    SUBJECTIVE:   She has no new complaints. Discussed with podiatry  OBJECTIVE   VITALS    height is 5' 5\" (1.651 m) and weight is 198 lb (89.8 kg). Her oral temperature is 97.4 °F (36.3 °C). Her blood pressure is 130/58 (abnormal) and her pulse is 80. Her respiration is 16 and oxygen saturation is 95%.        Wt Readings from Last 3 Encounters:   21 198 lb (89.8 kg)       I/O (24 Hours)    Intake/Output Summary (Last 24 hours) at 2021 1149  Last data filed at 2021 7614  Gross per 24 hour   Intake 1630.21 ml   Output    Net 1630.21 ml       General Appearance  Awake, alert, oriented,  not  In acute distress  HEENT - normocephalic, atraumatic, pink conjunctiva,  anicteric sclera  Neck - Supple, no mass  Lungs -  Bilateral   air entry, no rhonchi, no wheeze  Cardiovascular - Heart sounds are normal.    Abdomen - soft, not distended, nontender,   Neurologic -oriented  Skin - No bruising or bleeding  Extremities - dressed left foot          MEDICATIONS:      [Held by provider] lisinopril  20 mg Oral Daily    pantoprazole  40 mg Oral QAM AC    atorvastatin  20 mg Oral Daily    oxyCODONE-acetaminophen  1 tablet Oral BID    sodium chloride flush  10 mL Intravenous 2 times per day    bisacodyl  5 mg Oral Daily    sodium chloride flush  10 mL Intravenous 2 times per day    piperacillin-tazobactam  3,375 mg Intravenous Q8H      dextrose       ALPRAZolam, sodium chloride flush, oxyCODONE **OR** oxyCODONE, sodium chloride flush, promethazine **OR** ondansetron, polyethylene glycol, acetaminophen **OR** acetaminophen, glucose, dextrose, glucagon (rDNA), dextrose      LABS:     CBC:   Recent Labs     21 2300   WBC 8.9   HGB 10.5*        BMP:    Recent Labs     02/04/21  2300 02/05/21  0618 02/06/21  0546    143 141   K 3.9 4.1 4.1  4.1    106 112*   CO2 21* 24 23   BUN 32* 33* 29*   CREATININE 1.9* 2.0* 1.7*   GLUCOSE 114* 104 137*     Calcium:  Recent Labs     02/06/21  0546   CALCIUM 8.1*      Recent Labs     02/05/21 2006 02/06/21  0614 02/06/21  1039   POCGLU 166* 141* 199*     HgbA1C:   Recent Labs     02/04/21  2300   LABA1C 5.5        CULTURES:   UA: No results for input(s): SPECGRAV, PHUR, COLORU, CLARITYU, MUCUS, PROTEINU, BLOODU, RBCUA, WBCUA, BACTERIA, NITRU, GLUCOSEU, BILIRUBINUR, UROBILINOGEN, KETUA, LABCAST, LABCASTTY, AMORPHOS in the last 72 hours. Invalid input(s): CRYSTALS  Micro: No results found for: BC       Problem list of patient:     Patient Active Problem List   Diagnosis Code    Cellulitis L03.90    Chronic kidney disease N18.9    Diabetes mellitus ([facility] [address] E11.9    Hypertension I10    Acute osteomyelitis of toe of left foot ([facility] [address] M86.172         ASSESSMENT/PLAN   Left foot OM with cellulites: she had resection of the toe. Continue current antibiotic. Will follow the cx report  She wants to go home tomorrow.       Missael Eden MD, Ragini Spencer 2/6/2021 11:49 AM

## 2021-02-06 NOTE — PROGRESS NOTES
Pt back from surgery via bed. Call light placed within reach. 0.9 infusing at 125 ml/hr with approx 300 ml to count. No apple or drains noted. See flowsheet for further info.

## 2021-02-06 NOTE — OP NOTE
[address] [place] [address]                                OPERATIVE REPORT    PATIENT NAME: Gorden Dancer                     :        1945  MED REC NO:   [id number]                           ROOM:       0013  ACCOUNT NO:   [de-identified]                           ADMIT DATE: 2021  PROVIDER:     Lo Vuong D.P.M. DATE OF PROCEDURE:  2021    PREOPERATIVE DIAGNOSES:  Osteomyelitis, second toe, left foot;  hammertoe, second digit, left foot; ulcer distal end, second toe, left  foot; overlapping second toe, left foot; hallux interphalangeus and  hallux abductovalgus, left foot; hammertoes, third and fourth digits,  left foot. DIAGNOSES:  Osteomyelitis, second toe, left foot; hammertoe, second  digit, left foot; ulcer distal end, second toe, left foot; overlapping  second toe, left foot; hallux interphalangeus and hallux abductovalgus,  left foot; hammertoes, third and fourth digits, left foot. Pending the microbiology of the second toe amputated. PROCEDURE PERFORMED:  Amputation of second toe with primary closure at  the metatarsophalangeal head. SURGEON:  Lo Vuong DPM    ASSISTANT:  Ulanda Shone. SECOND ASSISTANT:  Geraldine Peter. OPERATIVE PROCEDURE:  The patient was taken to the operating room,  identified with the time-out. The foot had been previously marked in  the waiting area. Foot was prepped and draped in usual aseptic manner. Attention was then directed to the second digit, left foot where a two  encompassing incisions were made starting at the top of the foot and  circling down around and we went ahead and totally circumscribed the  second toe, with care to identify and retract vital structures. Superficial bleeders were clamped and bovied or clamped and ligated as  deemed necessary.   We then went ahead and amputated the entire second toe and submitted to Pathology/Microbiology for aerobic and anaerobic  cultures. The patient at this time was enclosed with a 3-0 nylon and dressed with  Adaptic, Betadine, 4 x 4, Kerlix, stockinette, Ace wrap, and she has a  postop shoe from our office. The patient at this time will be held until we have the microbiology  until Dr. Laila Moralez can go ahead and tell us what antibiotic she needs to  be on. Once we have that, then we will go ahead and proceed to  discharge her. SHANNEN JENSEN D.P.M.    D: 02/05/2021 14:15:09       T: 02/05/2021 15:25:32     [email or web address]  Job#: [id number]     Doc#: [id number]    CC:

## 2021-02-06 NOTE — PROGRESS NOTES
Assessment and Plan:        1. Osteomyelitis toe; amputated 2.5. Culture pending. Dr Raines Pean following. 2. ckd- creatinine improved today  3. Diabetes mellitus- sugars under control even without meds today. Her GFR too low for metformin. A1C only 5.5    CC:  Sore toe  HPI: pt with dm, ckd presents with swollen toe of several weeks duration. Underwent amputation 2.5 for osteo  ROS (12 point review of systems completed. Pertinent positives noted. Otherwise ROS is negative) :   PMH:  Per HPI  SHX:  Recent .  nonsmoker  FHX: Noncontributory    Allergies: See above    Medications:     dextrose        [Held by provider] lisinopril  20 mg Oral Daily    pantoprazole  40 mg Oral QAM AC    atorvastatin  20 mg Oral Daily    oxyCODONE-acetaminophen  1 tablet Oral BID    sodium chloride flush  10 mL Intravenous 2 times per day    bisacodyl  5 mg Oral Daily    sodium chloride flush  10 mL Intravenous 2 times per day    piperacillin-tazobactam  3,375 mg Intravenous Q8H       Vital Signs:   BP (!) 121/58   Pulse 84   Temp 97.4 °F (36.3 °C) (Oral)   Resp 16   Ht 5' 5\" (1.651 m)   Wt 198 lb (89.8 kg)   SpO2 94%   BMI 32.95 kg/m²      Intake/Output Summary (Last 24 hours) at 2/6/2021 2317  Last data filed at 2/6/2021 4021  Gross per 24 hour   Intake 1430.21 ml   Output    Net 1430.21 ml        Physical Examination: General appearance - alert, well appearing, and in no distress  Mental status - alert, oriented to person, place, and time  Neck - supple, no significant adenopathy, no JVD, or carotid bruits  Chest - clear to auscultation, no wheezes, rales or rhonchi, symmetric air entry  Heart - normal rate, regular rhythm, normal S1, S2, no murmurs, rubs, clicks or gallops  Abdomen - soft, nontender, nondistended, no masses or organomegaly  Neurological - diminished sensation feet  Musculoskeletal - foot wrapped  Extremities - no pedal edema noted, pulse 2+ right, left wrapped Skin - normal coloration and turgor, no rashes, no suspicious skin lesions noted    Data: (All radiographs, tracings, PFTs, and imaging are personally viewed and interpreted unless otherwise noted). ?  Reviewed labs      Electronically signed by Mickle Sandifer, MD on 2/6/2021 at 6:24 AM

## 2021-02-07 VITALS
HEART RATE: 83 BPM | OXYGEN SATURATION: 96 % | RESPIRATION RATE: 16 BRPM | SYSTOLIC BLOOD PRESSURE: 143 MMHG | HEIGHT: 65 IN | TEMPERATURE: 98.6 F | DIASTOLIC BLOOD PRESSURE: 67 MMHG | WEIGHT: 198 LBS | BODY MASS INDEX: 32.99 KG/M2

## 2021-02-07 LAB
ANION GAP SERPL CALCULATED.3IONS-SCNC: 7 MEQ/L (ref 8–16)
BUN BLDV-MCNC: 33 MG/DL (ref 7–22)
CALCIUM SERPL-MCNC: 8.6 MG/DL (ref 8.5–10.5)
CHLORIDE BLD-SCNC: 111 MEQ/L (ref 98–111)
CO2: 23 MEQ/L (ref 23–33)
CREAT SERPL-MCNC: 1.9 MG/DL (ref 0.4–1.2)
GFR SERPL CREATININE-BSD FRML MDRD: 26 ML/MIN/1.73M2
GLUCOSE BLD-MCNC: 130 MG/DL (ref 70–108)
GLUCOSE BLD-MCNC: 140 MG/DL (ref 70–108)
GLUCOSE BLD-MCNC: 173 MG/DL (ref 70–108)
POTASSIUM REFLEX MAGNESIUM: 4.5 MEQ/L (ref 3.5–5.2)
SODIUM BLD-SCNC: 141 MEQ/L (ref 135–145)

## 2021-02-07 PROCEDURE — 2580000003 HC RX 258: Performed by: STUDENT IN AN ORGANIZED HEALTH CARE EDUCATION/TRAINING PROGRAM

## 2021-02-07 PROCEDURE — 80048 BASIC METABOLIC PNL TOTAL CA: CPT

## 2021-02-07 PROCEDURE — 97530 THERAPEUTIC ACTIVITIES: CPT

## 2021-02-07 PROCEDURE — 6360000002 HC RX W HCPCS: Performed by: STUDENT IN AN ORGANIZED HEALTH CARE EDUCATION/TRAINING PROGRAM

## 2021-02-07 PROCEDURE — 97535 SELF CARE MNGMENT TRAINING: CPT

## 2021-02-07 PROCEDURE — 6370000000 HC RX 637 (ALT 250 FOR IP): Performed by: STUDENT IN AN ORGANIZED HEALTH CARE EDUCATION/TRAINING PROGRAM

## 2021-02-07 PROCEDURE — 36415 COLL VENOUS BLD VENIPUNCTURE: CPT

## 2021-02-07 PROCEDURE — 94760 N-INVAS EAR/PLS OXIMETRY 1: CPT

## 2021-02-07 PROCEDURE — 99231 SBSQ HOSP IP/OBS SF/LOW 25: CPT | Performed by: INTERNAL MEDICINE

## 2021-02-07 PROCEDURE — 82948 REAGENT STRIP/BLOOD GLUCOSE: CPT

## 2021-02-07 PROCEDURE — 97165 OT EVAL LOW COMPLEX 30 MIN: CPT

## 2021-02-07 RX ORDER — CLINDAMYCIN HYDROCHLORIDE 300 MG/1
300 CAPSULE ORAL 3 TIMES DAILY
Qty: 15 CAPSULE | Refills: 0 | Status: SHIPPED | OUTPATIENT
Start: 2021-02-07 | End: 2021-02-12

## 2021-02-07 RX ADMIN — BISACODYL 5 MG: 5 TABLET, COATED ORAL at 09:09

## 2021-02-07 RX ADMIN — SODIUM CHLORIDE, PRESERVATIVE FREE 10 ML: 5 INJECTION INTRAVENOUS at 09:09

## 2021-02-07 RX ADMIN — PANTOPRAZOLE SODIUM 40 MG: 40 TABLET, DELAYED RELEASE ORAL at 09:08

## 2021-02-07 RX ADMIN — PIPERACILLIN AND TAZOBACTAM 3375 MG: 3; .375 INJECTION, POWDER, LYOPHILIZED, FOR SOLUTION INTRAVENOUS at 10:15

## 2021-02-07 RX ADMIN — OXYCODONE HYDROCHLORIDE AND ACETAMINOPHEN 1 TABLET: 7.5; 325 TABLET ORAL at 09:08

## 2021-02-07 RX ADMIN — PIPERACILLIN AND TAZOBACTAM 3375 MG: 3; .375 INJECTION, POWDER, LYOPHILIZED, FOR SOLUTION INTRAVENOUS at 00:50

## 2021-02-07 RX ADMIN — SODIUM CHLORIDE, PRESERVATIVE FREE 10 ML: 5 INJECTION INTRAVENOUS at 10:16

## 2021-02-07 ASSESSMENT — PAIN DESCRIPTION - LOCATION
LOCATION: GENERALIZED
LOCATION: BACK

## 2021-02-07 ASSESSMENT — PAIN DESCRIPTION - PROGRESSION: CLINICAL_PROGRESSION: GRADUALLY IMPROVING

## 2021-02-07 ASSESSMENT — PAIN - FUNCTIONAL ASSESSMENT: PAIN_FUNCTIONAL_ASSESSMENT: PREVENTS OR INTERFERES SOME ACTIVE ACTIVITIES AND ADLS

## 2021-02-07 ASSESSMENT — PAIN DESCRIPTION - DESCRIPTORS: DESCRIPTORS: ACHING

## 2021-02-07 ASSESSMENT — PAIN DESCRIPTION - PAIN TYPE
TYPE: CHRONIC PAIN
TYPE: CHRONIC PAIN

## 2021-02-07 ASSESSMENT — PAIN DESCRIPTION - ONSET: ONSET: ON-GOING

## 2021-02-07 NOTE — PROGRESS NOTES
[address] 59  INPATIENT OCCUPATIONAL THERAPY  [facility] ORTHOPEDICS 7K  EVALUATION    Time:   Time In: 745  Time Out: 845  Timed Code Treatment Minutes: 40 Minutes  Minutes: 60          Date: 2021  Patient Name: Lashonda Jauregui,   Gender: female      MRN: [id number]  : 1945  (67 y.o.)  Referring Practitioner: Jennifer Wylie DPM  Diagnosis: acute osteomyelitis of L toe with amputation. Additional Pertinent Hx: per / H & P note; The patient is a 67 y.o. female with significant past medical history of diabetes, hypertension, CKD who was seen by Dr. Kirstie Albert today in clinic. Patient is accompanied by her children. Patient relates that she has had a wound on her second toe for approximately 1 month. She has been neuropathic for a very long time. She denies any fever, chills, nausea, vomiting, chest pain or shortness of breath. Patient does have some discomfort to the left leg. She denies being on any blood thinners but does sit often. They had discussed with Dr. Kirstie Albert in clinic that she has infection in her bone and the best course of action would be a digit amputation under MAC sedation. Patient and family all agree to the treatment. Restrictions/Precautions:  Restrictions/Precautions: Weight Bearing  Right Lower Extremity Weight Bearing: Partial Weight Bearing    Subjective  Chart Reviewed: Yes, Orders, Progress Notes, History and Physical  Patient assessed for rehabilitation services?: Yes  Family / Caregiver Present: No    Subjective: RN approved session. Patient supine in bed upon OT arrival. patient agreeable to evalution. A & O x 3.     Pain:  Pain Assessment  Pain Assessment: 0-10  Pain Level: 5  Pain Location: Back    Social/Functional History:  Lives With: Alone  Type of Home: House  Home Layout: One level  Home Access: Stairs to enter with rails  Entrance Stairs - Number of Steps: 3  Entrance Stairs - Rails: Left  Home Equipment: Rolling walker Bathroom Shower/Tub: Tub/Shower unit  Bathroom Toilet: Standard  Bathroom Equipment: 3-in-1 commode, Shower chair  Bathroom Accessibility: Accessible    Receives Help From: Family  ADL Assistance: Independent  Homemaking Assistance: Independent  Ambulation Assistance: Independent  Transfer Assistance: Independent    Active : Yes     Additional Comments: owns a dog    VISION:WFL    HEARING:  WFL    COGNITION: Decreased Recall    RANGE OF MOTION:  Right Upper Extremity: Impaired - AROM to approx 90 degrees with facial grimacing  Left Upper Extremity:  Impaired - AROM to approx 90 degrees with facial grimacing     STRENGTH:  Right Upper Extremity: Impaired - shldr flex 4-/5 ext 4/5 elbow flex and ext 4+/5  (F)  Left Upper Extremity:  Impaired - shldr flex  and ext 4/5 elbow flex and ext 4+/5  (P+)    SENSATION:   WFL    ADL:   Grooming: Stand By Assistance. standing at sink with cues for 2 w/w placement good compliance with L LE precautions   Bathing: Minimal Assistance. for back, SBA for rest of body including standing for thorough rakan area bathing   Upper Extremity Dressing: Minimal Assistance. for hospital gown  Lower Extremity Dressing: Minimal Assistance. difficulty getting clothing started over feet, increased time to doff/don R sock with SBA  Toileting: Stand By Assistance. with good compliance with L LE precautions  Toilet Transfer: [address] cues for hand placement to lower self onto toilet. would occasionally require MIN A to stand from toilet with grab bars. BALANCE:  Sitting Balance:  Independent. no difficulty during dynamic sitting tasks  Standing Balance: Contact Guard Assistance. with use of 2 w/w    BED MOBILITY:  Supine to Sit: Modified Independent with use of bed rails and increased time    TRANSFERS:  Sit to Stand:  Contact Guard Assistance.  cues for hand placement, sequencing and L LE precautions    FUNCTIONAL MOBILITY:  Assistive Device: Luis Armando Hernández Assist Level:  Contact Guard Assistance. Distance: To and from bathroom  Would lean forearms on 2 w/w when functionally ambulating. Exercise:  none completed this date    Activity Tolerance:  Patient tolerance of  treatment: good. Patient able to stand for prolonged periods of time for grooming and bathing tasks with good compliance for L LE precautions with use of 2 w/w for support. Assessment:  Assessment: Patient motivated to participate in treatment and tolerated tasks well. demosntrates good compliance with L LEprecautions. would benefit from continued therapy to increase overall endurance and ease and (I) with ADLs and functional transfers. Performance deficits / Impairments: Decreased ADL status, Decreased posture, Decreased endurance, Decreased strength  Prognosis: Good  REQUIRES OT FOLLOW UP: Yes  Decision Making: Low Complexity    Treatment Initiated: Treatment and education initiated within context of evaluation. Evaluation time included review of current medical information, gathering information related to past medical, social and functional history, completion of standardized testing, formal and informal observation of tasks, assessment of data and development of plan of care and goals. Treatment time included skilled education and facilitation of tasks to increase safety and independence with ADL's for improved functional independence and quality of life.     Discharge Recommendations:  Continue to assess pending progress, Home with Home health OT    Patient Education:  OT Education: OT Role, Plan of Care, ADL Adaptive Strategies, Transfer Training, Precautions  Barriers to Learning: none    Equipment Recommendations:       Plan:  Times per week: 5 x  Times per day: Daily Current Treatment Recommendations: Strengthening, Endurance Training, Neuromuscular Re-education, Patient/Caregiver Education & Training, Self-Care / ADL. See long-term goal time frame for expected duration of plan of care. If no long-term goals established, a short length of stay is anticipated. Goals:  Patient goals : return [facility]  Short term goals  Time Frame for Short term goals: by discharge  Short term goal 1: Patient will complete LB dressing with (I) with use of AE PRN  Short term goal 2: Patient will tolerate 10 min functional standing within L LE precautons with MOD I to increase ease with grooming and toileting routine. Short term goal 3: Patient will increase (B)  to (F+) to increase ease with clothing mgmt. Short term goal 4: Patient will complete functional transfers with MOD I with L LE precautions. Following session, patient left in safe position with all fall risk precautions in place.

## 2021-02-07 NOTE — PROGRESS NOTES
Department of Podiatric Surgery  History and Physical        CHIEF COMPLAINT: Left second toe infection    Reason for Admission: Left second toe infection, preop    History Obtained From:  patient, family member -children    2/7/21  Patient is 58-year-old female with significant past medical history of DM, HTN, CKD who is being seen at bedside on behalf of Dr. Davenport Officer. Patient states she is ready to discharge home today. She denies pain, fever, chills, nausea, vomiting, chest pain or shortness of breath at this time. 2/6/21  Patient is 58-year-old female with significant past medical history of DM, HTN, CKD who is being seen bedside with Dr. Davenport Officer. Patient denies pain, fever, chills, nausea, vomiting, chest pain or shortness of breath at this time. (Please see note completed by Dr. Davenport Officer on 2/6/2021)    2/5/21 HPI  The patient is a 67 y.o. female with significant past medical history of diabetes, hypertension, CKD who was seen by Dr. Ethel Bullard today in clinic. Patient is accompanied by her children. Patient relates that she has had a wound on her second toe for approximately 1 month. She has been neuropathic for a very long time. She denies any fever, chills, nausea, vomiting, chest pain or shortness of breath. Patient does have some discomfort to the left leg. She denies being on any blood thinners but does sit often. They had discussed with Dr. Ethel Bullard in clinic that she has infection in her bone and the best course of action would be a digit amputation under MAC sedation. Patient and family all agree to the treatment.     Past Medical History:        Diagnosis Date    Acute osteomyelitis of toe of left foot ([address] 2/6/2021    Arthritis     Blood circulation, collateral     Legs    Chronic kidney disease     Diabetes mellitus ([address]     Hypertension      Past Surgical History:        Procedure Laterality Date    JOINT REPLACEMENT       Immunizations:              Tetanus:  unknown Medications Prior to Admission:   Medications Prior to Admission: potassium chloride (MICRO-K) 10 MEQ extended release capsule, Take 10 mEq by mouth daily  bumetanide (BUMEX) 1 MG tablet, Take 1 mg by mouth daily  benazepril (LOTENSIN) 20 MG tablet, Take 20 mg by mouth daily  gabapentin (NEURONTIN) 400 MG capsule, Take 400 mg by mouth 3 times daily. glipiZIDE (GLUCOTROL) 5 MG tablet, Take 5 mg by mouth daily Takes at lunch  metFORMIN (GLUCOPHAGE) 500 MG tablet, Take 500 mg by mouth daily Takes at lunch  ALPRAZolam (XANAX) 0.25 MG tablet, Take 0.25 mg by mouth nightly as needed for Sleep.  simvastatin (ZOCOR) 40 MG tablet, Take 40 mg by mouth nightly  omeprazole (PRILOSEC) 40 MG delayed release capsule, Take 40 mg by mouth daily  oxyCODONE-acetaminophen (PERCOCET) 7.5-325 MG per tablet, Take 1 tablet by mouth 2 times daily. DULoxetine (CYMBALTA) 30 MG extended release capsule, Take 30 mg by mouth daily  hydrOXYzine (ATARAX) 25 MG tablet, Take 25 mg by mouth 3 times daily as needed for Itching (Sleep)  vitamin B-12 (CYANOCOBALAMIN) 1000 MCG tablet, Take 1,000 mcg by mouth daily  Cholecalciferol (VITAMIN D3) 1.25 MG (45540 UT) CAPS, Take by mouth    Allergies:  Patient has no known allergies. Social History:   TOBACCO:   reports that she has never smoked. She has never used smokeless tobacco.  ETOH:   reports no history of alcohol use. Family History:   History reviewed. No pertinent family history.   REVIEW OF SYSTEMS:  CONSTITUTIONAL:  negative    PHYSICAL EXAM:  VITALS:  /64   Pulse 72   Temp 98.2 °F (36.8 °C) (Oral)   Resp 16   Ht 5' 5\" (1.651 m)   Wt 198 lb (89.8 kg)   SpO2 96%   BMI 32.95 kg/m²     CONSTITUTIONAL:  awake, alert, cooperative, no apparent distress, and appears stated age      LOWER EXTREMITY:  Physical Exam: Vascular: Dorsalis pedis and posterior tibial pulses are very easily palpable bilaterally. Skin temperature is warm to warm from proximal tibial tuberosity to distal digits left greater than right. CFT brisk to exposed digits. Edema present and nonpitting, improving. Hair growth absent. Quality of skin within normal limits    Dermatologic: Incision site well coapted without signs of dehiscence, sutures intact. No active drainage, purulence, malodor, necrosis or PTB noted at this time. Neurovascular: Epicritic and protopathic sensation diminished bilateral.    Musculoskeletal: Negative pain on palpation. Second digit is hammered to the left side with distal cavus. IMAGING:  X-rays reviewed in office. LABS: Pending    ASSESSMENT AND PLAN:  1. Diabetic wound with osteomyelitis, second digit left foot  -Patient was initially examined and evaluated  -Afebrile  -Hemoglobin A1c 5.5 on 2/4/21  -A&A cultureprelim results: Staph (coagulase negative)  -Betadine applied to incision site and dressed with gauze, Kerlix, DSD  -Dressings clean, dry and intact. Contact Dr. Chary Melton office if dressings become wet/soiled  -Heel weightbearing in postop shoe for transfers  -Discharged on clindamycin  -Follow-up with Dr. Chary Melton office on Tuesday, 2/9/2021    2. Diabetes mellitus  -Hemoglobin A1c 5.5 on 2/4/21  -Her GFR too low for Metformin  -Hospitalist following    3. CKD  -Hospitalist following    4. Hypertension  -Hospitalist following    DISPO: Keep dressings clean, dry and intact. Heel weightbearing in postop shoe for transfers. Discharge on clindamycin.   Follow-up with Dr. Chary Melton office on Tuesday, 2/9/2021    Electronically signed by Sana Null DPM on 2/7/2021 at 9:38 AM

## 2021-02-07 NOTE — PROGRESS NOTES
VS STABLE; SUGARS OK WITHOUT MEDS. PT ADVISED HER CURRENT DIABETIC MEDS NOT OPTIMAL FOR HER DEGREE OF RENAL DYSFX; ADVISED TO FOLLOW UP WITH HER PCP. HER . .......................... A1C  IS 5.5 SUGGESTING CONTROL MAY BE A LITTLE TOO TIGHT.

## 2021-02-07 NOTE — DISCHARGE SUMMARY
[address] [place] [address]                               DISCHARGE SUMMARY    PATIENT NAME: Anton Askew                     :        1945  MED REC NO:   [id number]                           ROOM:       0013  ACCOUNT NO:   [de-identified]                           ADMIT DATE: 2021  PROVIDER:     Bridget Rosenberg. BART Vuong            DISCH DATE:    BRIEF HISTORY:  Yesterday, we took her to surgery and removed her second  toe, left foot, as a result of osteomyelitis in the second toe. You  could really quite very easily tell that she had acute osteomyelitis  when we put the towel clamp into her toe and it just sunk right to the  bone as if the bone was just totally mushed. So, we removed the toe in  total and at that time, there was a little bit of red streaks in the  dorsum of the foot, but not anything too bad, so we went ahead and did a  primary closure on that date. Today, 24 hours later, we did a dressing  change and found no red streaks whatsoever. We painted the wound with  Betadine and some 2 x 2's and we went ahead and used an ABD pad and a  roll of Kerlix on the foot. The patient at this time looks really good. No signs of pus, exudate, or odor. We are waiting for the aerobic,  anaerobic culture of the bone so that we know what oral antibiotic to  put the patient on, so she can go home on the correct oral antibiotic. The patient did express the desire to leave as soon as possible and I  told her until we have the culture and sensitivity, we do not know what  antibiotic to put her on; so it is in her best interest to stay long  enough to find out what the culture is and I discussed the case with Dr. Lyn Mckeon today as he walked up and he agrees with our overall  recommendation for this patient.     So, the patient did not have to have the second metatarsal head resection closed in order to get closure where you will do a skin flap  from the plantar aspect of the toe up to completely cover it and it is  completely closed and space closed at this point. SHANNEN JENSEN D.P.M.    D: 02/06/2021 11:20:59       T: 02/06/2021 12:18:17     [email or web address]_ASHTYN  Job#: [id number]     Doc#: [id number]    CC:

## 2021-02-09 LAB
AEROBIC CULTURE: NORMAL
ANAEROBIC CULTURE: NORMAL
GRAM STAIN RESULT: NORMAL

## 2021-02-10 LAB
AEROBIC CULTURE: NORMAL
ANAEROBIC CULTURE: NORMAL
GRAM STAIN RESULT: NORMAL

## 2021-02-24 NOTE — OP NOTE
[address] [place] [address]                                OPERATIVE REPORT    PATIENT NAME: Lety Rome                     :        1945  MED REC NO:   [id number]                           ROOM:       0013  ACCOUNT NO:   [de-identified]                           ADMIT DATE: 2021  PROVIDER:     Candace Jarrett. BART Jensen DATE OF PROCEDURE:  2021    ADDENDUM    ESTIMATED BLOOD LOSS:  10 mL. SHANNEN JENSEN D.P.M.    D: 2021 20:17:54       T: 2021 3:22:55     NEETU/FELICIA_HENRY  Job#: [id number]     Doc#: [id number]

## 2022-03-14 ENCOUNTER — HOSPITAL ENCOUNTER (INPATIENT)
Age: 77
LOS: 3 days | Discharge: HOME | DRG: 639 | End: 2022-03-17
Attending: PODIATRIST | Admitting: PODIATRIST
Payer: MEDICARE

## 2022-03-14 ENCOUNTER — APPOINTMENT (OUTPATIENT)
Dept: GENERAL RADIOLOGY | Age: 77
DRG: 639 | End: 2022-03-14
Attending: PODIATRIST
Payer: MEDICARE

## 2022-03-14 PROBLEM — E13.621 HEEL ULCER DUE TO SECONDARY DM (HCC): Status: ACTIVE | Noted: 2022-03-14

## 2022-03-14 PROBLEM — M86.172 OSTEOMYELITIS OF FOOT, LEFT, ACUTE (HCC): Status: ACTIVE | Noted: 2022-03-14

## 2022-03-14 PROBLEM — M86.171 OSTEOMYELITIS OF FOOT, RIGHT, ACUTE (HCC): Status: ACTIVE | Noted: 2022-03-14

## 2022-03-14 PROBLEM — L97.409 HEEL ULCER DUE TO SECONDARY DM (HCC): Status: ACTIVE | Noted: 2022-03-14

## 2022-03-14 LAB
GLUCOSE BLD-MCNC: 52 MG/DL (ref 70–108)
GLUCOSE BLD-MCNC: 82 MG/DL (ref 70–108)

## 2022-03-14 PROCEDURE — 99222 1ST HOSP IP/OBS MODERATE 55: CPT | Performed by: HOSPITALIST

## 2022-03-14 PROCEDURE — 73630 X-RAY EXAM OF FOOT: CPT

## 2022-03-14 PROCEDURE — 2580000003 HC RX 258: Performed by: PODIATRIST

## 2022-03-14 PROCEDURE — 87147 CULTURE TYPE IMMUNOLOGIC: CPT

## 2022-03-14 PROCEDURE — 82948 REAGENT STRIP/BLOOD GLUCOSE: CPT

## 2022-03-14 PROCEDURE — 87075 CULTR BACTERIA EXCEPT BLOOD: CPT

## 2022-03-14 PROCEDURE — 1200000000 HC SEMI PRIVATE

## 2022-03-14 PROCEDURE — 87077 CULTURE AEROBIC IDENTIFY: CPT

## 2022-03-14 PROCEDURE — 87205 SMEAR GRAM STAIN: CPT

## 2022-03-14 PROCEDURE — 6360000002 HC RX W HCPCS: Performed by: PODIATRIST

## 2022-03-14 PROCEDURE — 87186 SC STD MICRODIL/AGAR DIL: CPT

## 2022-03-14 PROCEDURE — 87070 CULTURE OTHR SPECIMN AEROBIC: CPT

## 2022-03-14 RX ORDER — SODIUM CHLORIDE 9 MG/ML
INJECTION, SOLUTION INTRAVENOUS CONTINUOUS
Status: DISCONTINUED | OUTPATIENT
Start: 2022-03-14 | End: 2022-03-17 | Stop reason: HOSPADM

## 2022-03-14 RX ORDER — HYDROXYZINE HYDROCHLORIDE 25 MG/1
25 TABLET, FILM COATED ORAL 3 TIMES DAILY PRN
Status: DISCONTINUED | OUTPATIENT
Start: 2022-03-14 | End: 2022-03-17 | Stop reason: HOSPADM

## 2022-03-14 RX ORDER — ATORVASTATIN CALCIUM 20 MG/1
20 TABLET, FILM COATED ORAL DAILY
Status: DISCONTINUED | OUTPATIENT
Start: 2022-03-15 | End: 2022-03-17 | Stop reason: HOSPADM

## 2022-03-14 RX ORDER — PANTOPRAZOLE SODIUM 40 MG/1
40 TABLET, DELAYED RELEASE ORAL
Status: CANCELLED | OUTPATIENT
Start: 2022-03-15

## 2022-03-14 RX ORDER — HYDROXYZINE HYDROCHLORIDE 25 MG/1
25 TABLET, FILM COATED ORAL 3 TIMES DAILY PRN
Status: CANCELLED | OUTPATIENT
Start: 2022-03-14

## 2022-03-14 RX ORDER — OXYCODONE AND ACETAMINOPHEN 7.5; 325 MG/1; MG/1
1 TABLET ORAL 2 TIMES DAILY
Status: CANCELLED | OUTPATIENT
Start: 2022-03-14

## 2022-03-14 RX ORDER — ONDANSETRON 4 MG/1
4 TABLET, ORALLY DISINTEGRATING ORAL EVERY 8 HOURS PRN
Status: DISCONTINUED | OUTPATIENT
Start: 2022-03-14 | End: 2022-03-17 | Stop reason: HOSPADM

## 2022-03-14 RX ORDER — SODIUM CHLORIDE 9 MG/ML
25 INJECTION, SOLUTION INTRAVENOUS PRN
Status: DISCONTINUED | OUTPATIENT
Start: 2022-03-14 | End: 2022-03-17 | Stop reason: HOSPADM

## 2022-03-14 RX ORDER — DULOXETIN HYDROCHLORIDE 30 MG/1
30 CAPSULE, DELAYED RELEASE ORAL DAILY
Status: DISCONTINUED | OUTPATIENT
Start: 2022-03-15 | End: 2022-03-17 | Stop reason: HOSPADM

## 2022-03-14 RX ORDER — LISINOPRIL 20 MG/1
20 TABLET ORAL DAILY
Status: CANCELLED | OUTPATIENT
Start: 2022-03-14

## 2022-03-14 RX ORDER — ALPRAZOLAM 0.25 MG/1
0.25 TABLET ORAL NIGHTLY PRN
Status: DISCONTINUED | OUTPATIENT
Start: 2022-03-14 | End: 2022-03-17 | Stop reason: HOSPADM

## 2022-03-14 RX ORDER — GABAPENTIN 400 MG/1
400 CAPSULE ORAL 3 TIMES DAILY
Status: CANCELLED | OUTPATIENT
Start: 2022-03-14

## 2022-03-14 RX ORDER — POTASSIUM CHLORIDE 750 MG/1
10 TABLET, FILM COATED, EXTENDED RELEASE ORAL DAILY
Status: DISCONTINUED | OUTPATIENT
Start: 2022-03-15 | End: 2022-03-17 | Stop reason: HOSPADM

## 2022-03-14 RX ORDER — SODIUM CHLORIDE 0.9 % (FLUSH) 0.9 %
5-40 SYRINGE (ML) INJECTION PRN
Status: DISCONTINUED | OUTPATIENT
Start: 2022-03-14 | End: 2022-03-17 | Stop reason: HOSPADM

## 2022-03-14 RX ORDER — NICOTINE POLACRILEX 4 MG
15 LOZENGE BUCCAL PRN
Status: DISCONTINUED | OUTPATIENT
Start: 2022-03-14 | End: 2022-03-14 | Stop reason: CLARIF

## 2022-03-14 RX ORDER — DULOXETIN HYDROCHLORIDE 30 MG/1
30 CAPSULE, DELAYED RELEASE ORAL DAILY
Status: CANCELLED | OUTPATIENT
Start: 2022-03-14

## 2022-03-14 RX ORDER — DEXTROSE MONOHYDRATE 50 MG/ML
100 INJECTION, SOLUTION INTRAVENOUS PRN
Status: DISCONTINUED | OUTPATIENT
Start: 2022-03-14 | End: 2022-03-17 | Stop reason: HOSPADM

## 2022-03-14 RX ORDER — OXYCODONE AND ACETAMINOPHEN 7.5; 325 MG/1; MG/1
1 TABLET ORAL 2 TIMES DAILY
Status: DISCONTINUED | OUTPATIENT
Start: 2022-03-14 | End: 2022-03-17 | Stop reason: HOSPADM

## 2022-03-14 RX ORDER — DEXTROSE MONOHYDRATE 25 G/50ML
12.5 INJECTION, SOLUTION INTRAVENOUS PRN
Status: DISCONTINUED | OUTPATIENT
Start: 2022-03-14 | End: 2022-03-14 | Stop reason: CLARIF

## 2022-03-14 RX ORDER — GABAPENTIN 400 MG/1
400 CAPSULE ORAL 3 TIMES DAILY
Status: DISCONTINUED | OUTPATIENT
Start: 2022-03-14 | End: 2022-03-17 | Stop reason: HOSPADM

## 2022-03-14 RX ORDER — POTASSIUM CHLORIDE 750 MG/1
10 CAPSULE, EXTENDED RELEASE ORAL DAILY
Status: CANCELLED | OUTPATIENT
Start: 2022-03-14

## 2022-03-14 RX ORDER — LANOLIN ALCOHOL/MO/W.PET/CERES
1000 CREAM (GRAM) TOPICAL DAILY
Status: DISCONTINUED | OUTPATIENT
Start: 2022-03-15 | End: 2022-03-17 | Stop reason: HOSPADM

## 2022-03-14 RX ORDER — ACETAMINOPHEN 650 MG/1
650 SUPPOSITORY RECTAL EVERY 6 HOURS PRN
Status: DISCONTINUED | OUTPATIENT
Start: 2022-03-14 | End: 2022-03-17 | Stop reason: HOSPADM

## 2022-03-14 RX ORDER — ONDANSETRON 2 MG/ML
4 INJECTION INTRAMUSCULAR; INTRAVENOUS EVERY 6 HOURS PRN
Status: DISCONTINUED | OUTPATIENT
Start: 2022-03-14 | End: 2022-03-17 | Stop reason: HOSPADM

## 2022-03-14 RX ORDER — POLYETHYLENE GLYCOL 3350 17 G/17G
17 POWDER, FOR SOLUTION ORAL DAILY PRN
Status: DISCONTINUED | OUTPATIENT
Start: 2022-03-14 | End: 2022-03-17 | Stop reason: HOSPADM

## 2022-03-14 RX ORDER — BUMETANIDE 1 MG/1
1 TABLET ORAL DAILY
Status: CANCELLED | OUTPATIENT
Start: 2022-03-14

## 2022-03-14 RX ORDER — LANOLIN ALCOHOL/MO/W.PET/CERES
1000 CREAM (GRAM) TOPICAL DAILY
Status: CANCELLED | OUTPATIENT
Start: 2022-03-14

## 2022-03-14 RX ORDER — ATORVASTATIN CALCIUM 20 MG/1
20 TABLET, FILM COATED ORAL DAILY
Status: CANCELLED | OUTPATIENT
Start: 2022-03-14

## 2022-03-14 RX ORDER — BUMETANIDE 1 MG/1
1 TABLET ORAL DAILY
Status: DISCONTINUED | OUTPATIENT
Start: 2022-03-15 | End: 2022-03-17 | Stop reason: HOSPADM

## 2022-03-14 RX ORDER — LISINOPRIL 20 MG/1
20 TABLET ORAL DAILY
Status: DISCONTINUED | OUTPATIENT
Start: 2022-03-15 | End: 2022-03-17 | Stop reason: HOSPADM

## 2022-03-14 RX ORDER — SODIUM CHLORIDE 0.9 % (FLUSH) 0.9 %
5-40 SYRINGE (ML) INJECTION EVERY 12 HOURS SCHEDULED
Status: DISCONTINUED | OUTPATIENT
Start: 2022-03-14 | End: 2022-03-17 | Stop reason: HOSPADM

## 2022-03-14 RX ORDER — ACETAMINOPHEN 325 MG/1
650 TABLET ORAL EVERY 6 HOURS PRN
Status: DISCONTINUED | OUTPATIENT
Start: 2022-03-14 | End: 2022-03-17 | Stop reason: HOSPADM

## 2022-03-14 RX ORDER — PANTOPRAZOLE SODIUM 40 MG/1
40 TABLET, DELAYED RELEASE ORAL
Status: DISCONTINUED | OUTPATIENT
Start: 2022-03-15 | End: 2022-03-17 | Stop reason: HOSPADM

## 2022-03-14 RX ADMIN — SODIUM CHLORIDE: 9 INJECTION, SOLUTION INTRAVENOUS at 22:49

## 2022-03-14 RX ADMIN — PIPERACILLIN AND TAZOBACTAM 3375 MG: 3; .375 INJECTION, POWDER, LYOPHILIZED, FOR SOLUTION INTRAVENOUS at 23:42

## 2022-03-14 RX ADMIN — SODIUM CHLORIDE, PRESERVATIVE FREE 10 ML: 5 INJECTION INTRAVENOUS at 21:00

## 2022-03-14 ASSESSMENT — PAIN DESCRIPTION - ORIENTATION: ORIENTATION: LOWER

## 2022-03-14 ASSESSMENT — PAIN DESCRIPTION - LOCATION: LOCATION: BACK

## 2022-03-14 ASSESSMENT — PAIN DESCRIPTION - PAIN TYPE: TYPE: CHRONIC PAIN

## 2022-03-14 ASSESSMENT — PAIN DESCRIPTION - DESCRIPTORS: DESCRIPTORS: ACHING

## 2022-03-14 ASSESSMENT — PAIN SCALES - GENERAL: PAINLEVEL_OUTOF10: 5

## 2022-03-15 LAB
BASOPHILS # BLD: 0.4 %
BASOPHILS ABSOLUTE: 0 THOU/MM3 (ref 0–0.1)
CREAT SERPL-MCNC: 1.7 MG/DL (ref 0.4–1.2)
EOSINOPHIL # BLD: 4.4 %
EOSINOPHILS ABSOLUTE: 0.3 THOU/MM3 (ref 0–0.4)
ERYTHROCYTE [DISTWIDTH] IN BLOOD BY AUTOMATED COUNT: 14.9 % (ref 11.5–14.5)
ERYTHROCYTE [DISTWIDTH] IN BLOOD BY AUTOMATED COUNT: 50.4 FL (ref 35–45)
GFR SERPL CREATININE-BSD FRML MDRD: 29 ML/MIN/1.73M2
GLUCOSE BLD-MCNC: 108 MG/DL (ref 70–108)
GLUCOSE BLD-MCNC: 112 MG/DL (ref 70–108)
GLUCOSE BLD-MCNC: 142 MG/DL (ref 70–108)
GLUCOSE BLD-MCNC: 147 MG/DL (ref 70–108)
GLUCOSE BLD-MCNC: 176 MG/DL (ref 70–108)
HCT VFR BLD CALC: 30.2 % (ref 37–47)
HEMOGLOBIN: 8.8 GM/DL (ref 12–16)
IMMATURE GRANS (ABS): 0.02 THOU/MM3 (ref 0–0.07)
IMMATURE GRANULOCYTES: 0.3 %
LYMPHOCYTES # BLD: 41.2 %
LYMPHOCYTES ABSOLUTE: 2.9 THOU/MM3 (ref 1–4.8)
MCH RBC QN AUTO: 26.9 PG (ref 26–33)
MCHC RBC AUTO-ENTMCNC: 29.1 GM/DL (ref 32.2–35.5)
MCV RBC AUTO: 92.4 FL (ref 81–99)
MONOCYTES # BLD: 7.2 %
MONOCYTES ABSOLUTE: 0.5 THOU/MM3 (ref 0.4–1.3)
NUCLEATED RED BLOOD CELLS: 0 /100 WBC
PLATELET # BLD: 234 THOU/MM3 (ref 130–400)
PMV BLD AUTO: 11.2 FL (ref 9.4–12.4)
RBC # BLD: 3.27 MILL/MM3 (ref 4.2–5.4)
SEG NEUTROPHILS: 46.5 %
SEGMENTED NEUTROPHILS ABSOLUTE COUNT: 3.3 THOU/MM3 (ref 1.8–7.7)
WBC # BLD: 7 THOU/MM3 (ref 4.8–10.8)

## 2022-03-15 PROCEDURE — 85025 COMPLETE CBC W/AUTO DIFF WBC: CPT

## 2022-03-15 PROCEDURE — 6370000000 HC RX 637 (ALT 250 FOR IP): Performed by: PODIATRIST

## 2022-03-15 PROCEDURE — 2580000003 HC RX 258: Performed by: STUDENT IN AN ORGANIZED HEALTH CARE EDUCATION/TRAINING PROGRAM

## 2022-03-15 PROCEDURE — 2580000003 HC RX 258: Performed by: PODIATRIST

## 2022-03-15 PROCEDURE — 36415 COLL VENOUS BLD VENIPUNCTURE: CPT

## 2022-03-15 PROCEDURE — 82948 REAGENT STRIP/BLOOD GLUCOSE: CPT

## 2022-03-15 PROCEDURE — 1200000000 HC SEMI PRIVATE

## 2022-03-15 PROCEDURE — 6360000002 HC RX W HCPCS: Performed by: STUDENT IN AN ORGANIZED HEALTH CARE EDUCATION/TRAINING PROGRAM

## 2022-03-15 PROCEDURE — 82565 ASSAY OF CREATININE: CPT

## 2022-03-15 PROCEDURE — 6360000002 HC RX W HCPCS: Performed by: PODIATRIST

## 2022-03-15 PROCEDURE — 99232 SBSQ HOSP IP/OBS MODERATE 35: CPT

## 2022-03-15 RX ADMIN — GABAPENTIN 400 MG: 400 CAPSULE ORAL at 14:17

## 2022-03-15 RX ADMIN — OXYCODONE HYDROCHLORIDE AND ACETAMINOPHEN 1 TABLET: 7.5; 325 TABLET ORAL at 23:01

## 2022-03-15 RX ADMIN — LISINOPRIL 20 MG: 20 TABLET ORAL at 07:54

## 2022-03-15 RX ADMIN — ATORVASTATIN CALCIUM 20 MG: 20 TABLET, FILM COATED ORAL at 22:03

## 2022-03-15 RX ADMIN — POTASSIUM CHLORIDE 10 MEQ: 750 TABLET, EXTENDED RELEASE ORAL at 07:53

## 2022-03-15 RX ADMIN — SODIUM CHLORIDE: 9 INJECTION, SOLUTION INTRAVENOUS at 13:00

## 2022-03-15 RX ADMIN — INSULIN LISPRO 2 UNITS: 100 INJECTION, SOLUTION INTRAVENOUS; SUBCUTANEOUS at 12:58

## 2022-03-15 RX ADMIN — PANTOPRAZOLE SODIUM 40 MG: 40 TABLET, DELAYED RELEASE ORAL at 05:21

## 2022-03-15 RX ADMIN — ENOXAPARIN SODIUM 40 MG: 100 INJECTION SUBCUTANEOUS at 09:28

## 2022-03-15 RX ADMIN — CEFTRIAXONE SODIUM 1000 MG: 1 INJECTION, POWDER, FOR SOLUTION INTRAMUSCULAR; INTRAVENOUS at 11:31

## 2022-03-15 RX ADMIN — GABAPENTIN 400 MG: 400 CAPSULE ORAL at 07:53

## 2022-03-15 RX ADMIN — OXYCODONE HYDROCHLORIDE AND ACETAMINOPHEN 1 TABLET: 7.5; 325 TABLET ORAL at 07:53

## 2022-03-15 RX ADMIN — GABAPENTIN 400 MG: 400 CAPSULE ORAL at 22:03

## 2022-03-15 RX ADMIN — BUMETANIDE 1 MG: 1 TABLET ORAL at 07:53

## 2022-03-15 RX ADMIN — ALPRAZOLAM 0.25 MG: 0.25 TABLET ORAL at 22:03

## 2022-03-15 RX ADMIN — PIPERACILLIN AND TAZOBACTAM 3375 MG: 3; .375 INJECTION, POWDER, LYOPHILIZED, FOR SOLUTION INTRAVENOUS at 07:48

## 2022-03-15 RX ADMIN — DULOXETINE HYDROCHLORIDE 30 MG: 30 CAPSULE, DELAYED RELEASE ORAL at 07:53

## 2022-03-15 RX ADMIN — Medication 1000 MCG: at 07:54

## 2022-03-15 ASSESSMENT — PAIN SCALES - GENERAL
PAINLEVEL_OUTOF10: 6
PAINLEVEL_OUTOF10: 4
PAINLEVEL_OUTOF10: 6

## 2022-03-15 ASSESSMENT — PAIN DESCRIPTION - ONSET
ONSET: ON-GOING
ONSET: ON-GOING

## 2022-03-15 ASSESSMENT — PAIN DESCRIPTION - ORIENTATION
ORIENTATION: LEFT
ORIENTATION: LEFT

## 2022-03-15 ASSESSMENT — PAIN DESCRIPTION - DESCRIPTORS
DESCRIPTORS: ACHING

## 2022-03-15 ASSESSMENT — PAIN DESCRIPTION - FREQUENCY
FREQUENCY: INTERMITTENT
FREQUENCY: CONTINUOUS

## 2022-03-15 ASSESSMENT — PAIN DESCRIPTION - LOCATION
LOCATION: BACK;HAND
LOCATION: GENERALIZED
LOCATION: HAND

## 2022-03-15 ASSESSMENT — PAIN DESCRIPTION - PAIN TYPE
TYPE: CHRONIC PAIN

## 2022-03-15 ASSESSMENT — PAIN - FUNCTIONAL ASSESSMENT: PAIN_FUNCTIONAL_ASSESSMENT: PREVENTS OR INTERFERES SOME ACTIVE ACTIVITIES AND ADLS

## 2022-03-15 ASSESSMENT — PAIN DESCRIPTION - PROGRESSION: CLINICAL_PROGRESSION: NOT CHANGED

## 2022-03-15 NOTE — PLAN OF CARE
Problem: Pain:  Goal: Pain level will decrease  Description: Pain level will decrease  Outcome: Ongoing  Note: Patient is rating pain at 5/10 during this shift. Patient is taking percocet BID (home dose) to control pain. Problem: Pain:  Goal: Control of acute pain  Description: Control of acute pain  Outcome: Ongoing     Problem: Pain:  Goal: Control of chronic pain  Description: Control of chronic pain  Outcome: Ongoing     Problem: Falls - Risk of:  Goal: Will remain free from falls  Description: Will remain free from falls  Outcome: Ongoing  Note: Call light, overbed table, and belongings within reach. Bed/ chair alarm activated. Up with 1 assist. Patient included in hourly rounds. Problem: Falls - Risk of:  Goal: Absence of physical injury  Description: Absence of physical injury  Outcome: Ongoing     Problem: Neurological  Goal: Maximum potential motor/sensory/cognitive function  Outcome: Ongoing  Note: Patient has neuropathy in all extremities. Takes gabapentin TID     Problem: Cardiovascular  Goal: No DVT, peripheral vascular complications  Outcome: Ongoing  Note: Patient free from DVT and peripheral vascular complications as evidence by no pain, redness, edema, or heat in extremities. Problem: Cardiovascular  Goal: Hemodynamic stability  Outcome: Ongoing     Problem: Skin Integrity/Risk  Goal: No skin breakdown during hospitalization  Outcome: Ongoing  Note: Patient is free from signs of skin breakdown during this shift. Patient is able to turn self in bed. Problem: Skin Integrity/Risk  Goal: Wound healing  Outcome: Ongoing  Note: Podiatry dressed wound, and they will round in the morning. Problem: Discharge Planning:  Goal: Patients continuum of care needs are met  Description: Patients continuum of care needs are met  Outcome: Ongoing  Note: Patient will return home at time of discharge. Patient does not have any questions at this time.       Care plan reviewed with patient and children. Patient and children verbalize understanding of the plan of care and contribute to goal setting.

## 2022-03-15 NOTE — CONSULTS
Hx and P/E :Infectious D. Patient - Bereket Brand,  Age - 67 y.o.    - 1945      Room Number - 6E-57/057-A   N -  [id number]   [facility]  Date of Admission -  3/14/2022  7:56 PM  Patient's PCP: Camila Powers     Requesting Physician: Court Anderson DPM    REASON FOR CONSULTATION     Left calcaneal osteomyelitis    HISTORY OF PRESENT ILLNESS     This is a very pleasant 67 y.o. female who was admitted to the hospital directly from her podiatrists office due to concerns of osteomyelitis of the left heel. Her medical history of signficant for NIDDM2 associated with polyneuropathy, CKD stage IIIb/IV, primary HTN and arthritis. In 2021, patient had osteomyelitis of her left foot resulting in amputation of her 2nd phalange. This was treated with IV Zosyn at the time with no organism identified on wound cultures. She reports that she first noticed the wound this past  after having discomfort during her shower. She has a diabetic shoe that she wears at home. She also has an right anterior shin ulcer that has scabbed now. She lives at home. Does not smoke. She states the wound on her left heel as remained dry. She went to her podiatrist Dr. Tenisha Cobos who was concerned for osteomyelitis. Xray of her left foot reviewed, revealed no gas formation in the soft tissue or evidence of osteomyelitis. She has been afebrile and has no leukocytosis. She denies experiencing any trauma to either extremity.     PAST MEDICAL AND SURGICAL HISTORY     Past Medical History:   Diagnosis Date    Acute osteomyelitis of toe of left foot ([facility] 2021    Arthritis     Blood circulation, collateral     Legs    Chronic kidney disease     Diabetes mellitus ([facility]     Hypertension      Past Surgical History:   Procedure Laterality Date    JOINT REPLACEMENT      TOE AMPUTATION Left 2021    LEFT SECOND TOE AMPUTATION performed by Court Anderson DPM at [facility] MEDICATIONS PRIOR TO ADMISSION:     Scheduled Meds:   sodium chloride flush  5-40 mL IntraVENous 2 times per day    enoxaparin  40 mg SubCUTAneous Daily    insulin lispro  0-12 Units SubCUTAneous TID WC    insulin lispro  0-6 Units SubCUTAneous Nightly    potassium chloride  10 mEq Oral Daily    bumetanide  1 mg Oral Daily    lisinopril  20 mg Oral Daily    gabapentin  400 mg Oral TID    atorvastatin  20 mg Oral Daily    pantoprazole  40 mg Oral QAM AC    oxyCODONE-acetaminophen  1 tablet Oral BID    DULoxetine  30 mg Oral Daily    vitamin B-12  1,000 mcg Oral Daily    piperacillin-tazobactam  3,375 mg IntraVENous Q8H     Continuous Infusions:   sodium chloride 75 mL/hr at 03/14/22 9969    sodium chloride      dextrose       PRN Meds:sodium chloride flush, sodium chloride, ondansetron **OR** ondansetron, polyethylene glycol, acetaminophen **OR** acetaminophen, glucagon (rDNA), dextrose, ALPRAZolam, hydrOXYzine, glucose, dextrose bolus (hypoglycemia)  Allergies:   ALLERGIES: Patient has no known allergies. SOCIAL HISTORY:     TOBACCO:   reports that she has never smoked. She has never used smokeless tobacco.     ETOH:   reports no history of alcohol use. FAMILY HISTORY:     History reviewed. No pertinent family history. REVIEW OF SYSTEMS:     Constitutional: no fever, no night sweats, no fatigue. Head: no head ache , no head injury, no migranes. Eye: no blurring of vision, no double vision.   Ears: no hearing difficulty, no tinnitus  Mouth/throat: no ulceration, dental caries , dysphagia  Lungs: no cough, no shortness of breath, no wheeze  CVS: no palpitation, no chest pain, no shortness of breath  GI: no abdominal pain, no nausea , no vomiting, no constipation  REY: no dysuria, frequency and urgency, no hematuria, no kidney stones  Musculoskeletal: pain in her left heel   Endocrine: no polyuria, polydipsia, no cold or heat intolerance  Hematology: no anemia, no easy brusing or bleeding, no hx of clotting disorder  Dermatology: open ulceration in her left heel and right anterior shin  Psychiatry: no depression, no anxiety,no panic attacks, no suicide ideation    PHYSICAL EXAM:     BP (!) 120/58   Pulse 70   Temp 97.6 °F (36.4 °C) (Oral)   Resp 18   Ht 5' 5\" (1.651 m)   Wt 203 lb 0.7 oz (92.1 kg)   SpO2 95%   BMI 33.79 kg/m²   General: non ill appearing. Awake, alert, not in distress. Cooperative and pleasant. HEENT: pink conjunctiva, unicteric sclera, moist oral mucosa. Chest:  bilateral air entry, Clear to auscultation,   Cardiovascular:  RRR ,S1S2, no murmur or gallop. Abdomen:  Soft, non tender to palpation. Extremities: lymphedema present in LE b/l. Right anterior shin has a small circumferential ulceration with scab formation. No erythema, warmth or tenderness in the RLE. Left foot has an open ulcer that reveals muscle and some fascia located in the left heel. No erythema or warmth present. No crepitations on palpitations. Mildly tender on palpation. ROM of the left foot is intact, normal effort. Skin:  Warm and dry. CNS: No focal deficit    LABS:     CBC:   Recent Labs     03/15/22  0624   WBC 7.0   HGB 8.8*        BMP:  No results for input(s): NA, K, CL, CO2, BUN, CREATININE, GLUCOSE in the last 72 hours. Calcium:No results for input(s): CALCIUM in the last 72 hours. Ionized Calcium:No results for input(s): IONCA in the last 72 hours. Magnesium:No results for input(s): MG in the last 72 hours. Phosphorus:No results for input(s): PHOS in the last 72 hours. BNP:No results for input(s): BNP in the last 72 hours. Glucose:  Recent Labs     03/14/22 2005 03/14/22 2135 03/15/22  0002   POCGLU 52* 82 142*     HgbA1C: No results for input(s): LABA1C in the last 72 hours. INR: No results for input(s): INR in the last 72 hours. Hepatic: No results for input(s): ALKPHOS, ALT, AST, PROT, BILITOT, BILIDIR, LABALBU in the last 72 hours.   Amylase and Lipase:No results for input(s): LACTA, AMYLASE in the last 72 hours. Lactic Acid: No results for input(s): LACTA in the last 72 hours. Troponin: No results for input(s): CKTOTAL, CKMB, TROPONINI in the last 72 hours. BNP: No results for input(s): BNP in the last 72 hours. Lipids: No results for input(s): CHOL, TRIG, HDL, LDL, LDLCALC in the last 72 hours. ABGs: No results found for: PHART, PO2ART, FJK0MYK, UIQ3DCR, BEART    Cultures:   UA: No results for input(s): SPECGRAV, PHUR, COLORU, CLARITYU, MUCUS, PROTEINU, BLOODU, RBCUA, [address], BACTERIA, NITRU, GLUCOSEU, BILIRUBINUR, UROBILINOGEN, KETUA, LABCAST, LABCASTTY, AMORPHOS in the last 72 hours. Invalid input(s): CRYSTALS     Imaging:    XR FOOT LEFT (MIN 3 VIEWS)    Result Date: 3/15/2022  Impression: No radiographic findings of osteomyelitis. See above for acute soft tissue findings and chronic senescent/degenerative changes. This document has been electronically signed by: Chaka Yoon MD on 03/15/2022 12:17 AM    Micro: No results found for: [facility]    Problem list of patient     Patient Active Problem List   Diagnosis    Cellulitis    Chronic kidney disease    Diabetes mellitus ([address]    Hypertension    Acute osteomyelitis of toe of left foot ([address]    Heel ulcer due to secondary DM ([address]    Osteomyelitis of foot, right, acute ([address]    Osteomyelitis of foot, left, acute ([address]     ASSESSMENT AND PLAN   Stage II non-purulent pressure ulceration of the left heel without cellulitis   Small laceration with scab formation of the right anterior shin without cellulitis   History of left foot osteomyelitis, s/p amputation of second phalange February 2021  NIDDM2 with peripheral neuropathy   Chronic bilateral LE lymphedema   CKD Stage IIIb/IV    Plan  · Will discontinue Zosyn and switch to Rocephin. Left  heel appears pressure induced, no clinical evidence of   osteomyelitis.  Patient may need higher arching diabetes shoes to avoid pressure in the heel  · Will await final wound culture reports for appropriate deescalation  · Will continue to follow     Case and plan discussed with Dr. Te Sweeney MD 3/15/2022 7:59 AM   Patient was seen and examined face-to-face by me  The chart, progress notes, labs and radiographs were reviewed. Case discussed with the nurse. Questions and concerns were addressed.   I agree with the note

## 2022-03-15 NOTE — PROGRESS NOTES
Upon entering the room pt stated that she did something bad and did not tell this RN. This RN asked what it was, and according to pt her \"daughter brought in her home medications in a days of the week pill organizer and she took all of her night meds at approximately 10pm\" on 3/14. This RN locked all found medications in closet, and told the patient that she was not to take her unlabeled home medications while in the hospital for her safety. All night time medications were not given per [facility] due to this incident.

## 2022-03-15 NOTE — H&P
Department of Podiatric Surgery  History and Physical        CHIEF COMPLAINT: Left heel wound    Reason for Admission: Osteomyelitis left calcaneus    History Obtained From:  patient, family member -children    HISTORY OF PRESENT ILLNESS:      The patient is a 67 y.o. female with significant past medical history of diabetes, hypertension, CKD who was seen by Dr. Sweta Holcomb today in clinic. Patient is accompanied by her children. Patient relates that she has had a wound on her left heel but does not know how long it has been there. She has been neuropathic for a very long time. She denies any fever, chills, nausea, vomiting, chest pain or shortness of breath. Patient does have some discomfort to the left leg. She denies being on any blood thinners but does sit often. They had discussed with Dr. Sweta Holcomb in clinic that she has infection in her bone and the best course of action would be admission for further workup. Past Medical History:        Diagnosis Date    Acute osteomyelitis of toe of left foot ([facility] [address] 2/6/2021    Arthritis     Blood circulation, collateral     Legs    Chronic kidney disease     Diabetes mellitus ([facility] [address]     Hypertension      Past Surgical History:        Procedure Laterality Date    JOINT REPLACEMENT      TOE AMPUTATION Left 2/5/2021    LEFT SECOND TOE AMPUTATION performed by Preston Collins.NOLA at [facility]                 Medications Prior to Admission:   Medications Prior to Admission: potassium chloride (MICRO-K) 10 MEQ extended release capsule, Take 10 mEq by mouth daily  bumetanide (BUMEX) 1 MG tablet, Take 1 mg by mouth daily  benazepril (LOTENSIN) 20 MG tablet, Take 20 mg by mouth daily  gabapentin (NEURONTIN) 400 MG capsule, Take 400 mg by mouth 3 times daily.   glipiZIDE (GLUCOTROL) 5 MG tablet, Take 5 mg by mouth daily Takes at lunch  metFORMIN (GLUCOPHAGE) 500 MG tablet, Take 500 mg by mouth daily Takes at lunch  ALPRAZolam (XANAX) 0.25 MG tablet, Take 0.25 mg by mouth nightly as needed for Sleep.  simvastatin (ZOCOR) 40 MG tablet, Take 40 mg by mouth nightly  omeprazole (PRILOSEC) 40 MG delayed release capsule, Take 40 mg by mouth daily  oxyCODONE-acetaminophen (PERCOCET) 7.5-325 MG per tablet, Take 1 tablet by mouth 2 times daily. DULoxetine (CYMBALTA) 30 MG extended release capsule, Take 30 mg by mouth daily  hydrOXYzine (ATARAX) 25 MG tablet, Take 25 mg by mouth 3 times daily as needed for Itching (Sleep)  vitamin B-12 (CYANOCOBALAMIN) 1000 MCG tablet, Take 1,000 mcg by mouth daily  Cholecalciferol (VITAMIN D3) 1.25 MG (32338 UT) CAPS, Take by mouth    Allergies:  Patient has no known allergies. Social History:   TOBACCO:   reports that she has never smoked. She has never used smokeless tobacco.  ETOH:   reports no history of alcohol use. Family History:   History reviewed. No pertinent family history. REVIEW OF SYSTEMS:  CONSTITUTIONAL:  negative    PHYSICAL EXAM:  VITALS:  /68   Pulse 76   Temp 98 °F (36.7 °C) (Oral)   Resp 16   SpO2 99%   CONSTITUTIONAL:  awake, alert, cooperative, no apparent distress, and appears stated age  EYES:  pupils equal, round and reactive to light, extra ocular muscles intact, sclera clear, conjunctiva normal  ENT:  normocepalic, without obvious abnormality  LUNGS:  No increased work of breathing, good air exchange, clear to auscultation bilaterally, no crackles or wheezing  CARDIOVASCULAR:  Normal apical impulse, regular rate and rhythm      LOWER EXTREMITY:  Physical Exam:   Vascular: Dorsalis pedis and posterior tibial pulses are easily palpable bilaterally. Skin temperature is warm to warm from proximal tibial tuberosity to distal digits bilateral. CFT brisk to exposed digits. Edema present and nonpitting to left foot. Hair growth absent. Quality of skin within normal limits    Dermatologic: Nails 1-5 bilaterally are thickened, elongated and dystrophic, with presence of subungual debris, excepting left 2nd digit. Full-thickness wound noted to posterior aspect of left heel, measuring 6cm x 3cm x 0.1cm. Negative PTB, positive malodor, scant serous drainage noted. There is no crepitus, surrounding erythema, or purulence noted. Neurovascular: Epicritic and protopathic sensation diminished bilateral.    Musculoskeletal: Muscle strength 5/5 for all plantarflexors, dorsiflexors, inverters and everters examined. Mild tenderness to palpation of left heel wound. 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 IMAGING:  Order XR 3 views left foot. LABS: Pending    ASSESSMENT AND PLAN:  1. Diabetic wound with osteomyelitis, left heel  -Patient was initially examined and evaluated  -Ordered CBC, BMP  Order XR 3 views left foot  -Order surgical shoe  Patient may weight-bear as tolerated in surgical shoe  Consult placed to infectious disease for antibiotic therapy, starting IV Zosyn  Aerobic and anaerobic culture taken  Consult placed to hospitalist for preoperative or stratification and medical management  PT and OT consult placed for gait training  Consult placed to case management for discharge planning  Podiatry will continue to follow this patient    2. Diabetes mellitus  Discontinue home regimen for now, will manage her with sliding scale. Consult placed to hospitalist  Hypoglycemia protocol in place    3. CKD  -Continue home medications  Consult placed to hospitalist for management    4. Hypertension  -Continue home medications  -Blood pressure is 136/68 on admission  Consult placed to hospitalist for management      DISPO: Pending cultures, XR left foot    Thanks for the opportunity to participate in this patient's care.      Kerri Mcmillan DPM

## 2022-03-15 NOTE — PROGRESS NOTES
Hospitalist Progress Note      Patient:  Osiel Lind    Unit/Bed:6E-57/057-A  YOB: 1945  MRN: [id number]   Acct: [de-identified]   PCP: Krzysztof Schmidt  Date of Admission: 3/14/2022    Assessment/Plan:    1. Left heel ulcer:   · There was suspicion for osteomyelitis on admission, management per primary. Pt is hemodynamically stable. · ID was consulted, pt transitioned from Zosyn to Rocephin. Awaiting final wound culture report. Risk assessment performed per hospitalist team on 03/15. 2. NIDDMII:   · Oral Hyperglycemics on hold. SSI, hypoglycemia protocol. Carb-controlled diet. Accu-checks. 3. CKD Stage III:   · Cr at pt's baseline, stable. Will monitor. Repeat BMP in AM.   4. Essential HTN:   · BP soft, will add parameters to home medications and monitor closely. 5. Obesity:   · BMI 33.79    Chief Complaint: Left heel ulcer     Initial H and P:-    Per chart review: Puja novak female who we are asked to see/evaluate by Ayo Goodman DPM for medical management of diabetes mellitus and hypertension. Patient is on the medicine under podiatry service for management of left heel wound/ulcer. Patient also has small right shin wound. Patient has a history of diabetes, hypertension and CKD and was seen by Dr. Joanie Posada today. Patient went to podiatry office today due to her left heel wound. Patient is not sure when her wound started. Patient was showering and felt little pain on the left heel and when she checked she saw a large ulcer on the left heel. Left heel wound was concerning for osteomyelitis and patient was directed to the hospital for evaluation and management. Patient is currently on Zosyn which was ordered by podiatry. Pain control ordered by podiatry as well. Patient has no other acute complaint at this time. Patient denies having underlying cardiac disease or pulmonary disease. \"     Subjective (past 24 hours): Patient is sitting up in chair in no acute distress. She reports that she is feeling well denying fever, chills, nausea, vomiting, abdominal pain, shortness of breath, chest pain, or any other complaints. Patient is being followed by primary as well as ID. ID switched antibiotics from Zosyn to Rocephin today. Patient is in stable condition. BP soft today, will add parameters to BP medications. Past medical history, family history, social history and allergies reviewed again and is unchanged since admission. ROS (All review of systems completed. Pertinent positives noted. Otherwise All other systems reviewed and negative.)     Medications:  Reviewed    Infusion Medications    sodium chloride 75 mL/hr at 03/15/22 1300    sodium chloride      dextrose       Scheduled Medications    cefTRIAXone (ROCEPHIN) IV  1,000 mg IntraVENous Q24H    sodium chloride flush  5-40 mL IntraVENous 2 times per day    enoxaparin  40 mg SubCUTAneous Daily    insulin lispro  0-12 Units SubCUTAneous TID WC    insulin lispro  0-6 Units SubCUTAneous Nightly    potassium chloride  10 mEq Oral Daily    bumetanide  1 mg Oral Daily    lisinopril  20 mg Oral Daily    gabapentin  400 mg Oral TID    atorvastatin  20 mg Oral Daily    pantoprazole  40 mg Oral QAM AC    oxyCODONE-acetaminophen  1 tablet Oral BID    DULoxetine  30 mg Oral Daily    vitamin B-12  1,000 mcg Oral Daily     PRN Meds: sodium chloride flush, sodium chloride, ondansetron **OR** ondansetron, polyethylene glycol, acetaminophen **OR** acetaminophen, glucagon (rDNA), dextrose, ALPRAZolam, hydrOXYzine, glucose, dextrose bolus (hypoglycemia)      Intake/Output Summary (Last 24 hours) at 3/15/2022 1341  Last data filed at 3/15/2022 1307  Gross per 24 hour   Intake 360 ml   Output 550 ml   Net -190 ml       Diet:  ADULT DIET;  Regular; 4 carb choices (60 gm/meal)    Exam:  BP (!) 108/47   Pulse 75   Temp 97.9 °F (36.6 °C) (Oral)   Resp 18   Ht 5' 5\" (1.651 m)   Wt 203 lb 0.7 oz (92.1 kg)   SpO2 93%   BMI 33.79 kg/m²   General appearance: No apparent distress, appears stated age and cooperative. HEENT: Pupils equal, round, and reactive to light. Conjunctivae/corneas clear. Neck: Supple, with full range of motion. No jugular venous distention. Trachea midline. Respiratory:  Normal respiratory effort. Clear to auscultation, bilaterally without Rales/Wheezes/Rhonchi. Cardiovascular: Regular rate and rhythm with normal S1/S2 without murmurs, rubs or gallops. Abdomen: Soft, non-tender, non-distended with normal bowel sounds. Musculoskeletal: LLE dressing d/c/i. Skin: Skin color, texture, turgor normal.  No rashes or lesions. Neurologic:  Neurovascularly intact without any focal sensory/motor deficits. Cranial nerves: II-XII intact, grossly non-focal.  Psychiatric: Alert and oriented, thought content appropriate, normal insight  Capillary Refill: Brisk,< 3 seconds   Peripheral Pulses: +2 palpable, equal bilaterally     Labs:   Recent Labs     03/15/22  0624   WBC 7.0   HGB 8.8*   HCT 30.2*        Recent Labs     03/15/22  1043   CREATININE 1.7*     No results for input(s): AST, ALT, BILIDIR, BILITOT, ALKPHOS in the last 72 hours. No results for input(s): INR in the last 72 hours. No results for input(s): Alexandre Sol in the last 72 hours. Microbiology:    Blood culture #1: No results found for: [facility]    Blood culture #2:No results found for: Viona Croak    Organism:No results found for: [facility]      Lab Results   Component Value Date    LABGRAM  03/14/2022     No segmented neutrophils observed. No epithelial cells observed. Rare gram positive cocci occurring singly and in pairs.         MRSA culture only:No results found for: [facility]    Urine culture: No results found for: LABURIN    Respiratory culture: No results found for: CULTRESP    Aerobic and Anaerobic :  Lab Results   Component Value Date    LABAERO No growth-preliminary  03/14/2022     Lab Results   Component Value Date    LABANAE No growth-preliminary No growth  02/05/2021       Urinalysis:    No results found for: Ifeanyi Heads, BACTERIA, RBCUA, BLOODU, SPECGRAV, GLUCOSEU    Radiology:  XR FOOT LEFT (MIN 3 VIEWS)   Final Result   Impression:   No radiographic findings of osteomyelitis. See above for acute soft tissue findings and chronic    senescent/degenerative changes. This document has been electronically signed by: Edi Olivia MD on    03/15/2022 12:17 AM        XR FOOT LEFT (MIN 3 VIEWS)    Result Date: 3/15/2022  Left foot, 3 views Comparison: None. Findings: Soft tissue swelling is noted across the forefoot as well as of the heel. There is bandage material posteriorly at the heel. There is no obvious gas in the soft tissues currently. Moderate midfoot degenerative changes evident. The second toe has been previously amputated. Moderate to severe degenerative changes are seen across the great toe with hallux valgus deformity noted. There are no focal density changes to the bones of the foot to otherwise indicate osteomyelitis at this time., MRI changes are usually several days to a week advanced of radiographic changes regarding osteomyelitis. There is a small enthesophyte at the Achilles insertion at the calcaneus. Small plantar heel spur is also evident with chronic fragmentation. Impression: No radiographic findings of osteomyelitis. See above for acute soft tissue findings and chronic senescent/degenerative changes.  This document has been electronically signed by: Edi Olivia MD on 03/15/2022 12:17 AM      Electronically signed by Phillip Knapp PA-C on 3/15/2022 at 1:41 PM

## 2022-03-15 NOTE — PROGRESS NOTES
Podiatric Progress Note  Adithya Sanford  Subjective :   3/15/2022  Patient seen at chair side today on behalf of Dr. Laurie Light. Patient appeared pleasant, was oriented to person, place and time and in no acute distress. Patient relates minimal pain to the left posterior heel. She also denies any N/V/F/C/SOB/CP or bilateral calf tenderness. Patient is curious as to what the treatment plan is. Patient has no further pedal concerns at this point in time. HPI  The patient is a 67 y.o. female with significant past medical history of diabetes, hypertension, CKD who was seen by Dr. Laurie Light today in clinic. Patient is accompanied by her children. Patient relates that she has had a wound on her left heel but does not know how long it has been there. She has been neuropathic for a very long time. She denies any fever, chills, nausea, vomiting, chest pain or shortness of breath. Patient does have some discomfort to the left leg. She denies being on any blood thinners but does sit often. They had discussed with Dr. Laurie Light in clinic that she has infection in her bone and the best course of action would be admission for further workup.     Current Medications:    Current Facility-Administered Medications: cefTRIAXone (ROCEPHIN) 1000 mg IVPB in 50 mL D5W minibag, 1,000 mg, IntraVENous, Q24H  0.9 % sodium chloride infusion, , IntraVENous, Continuous  sodium chloride flush 0.9 % injection 5-40 mL, 5-40 mL, IntraVENous, 2 times per day  sodium chloride flush 0.9 % injection 5-40 mL, 5-40 mL, IntraVENous, PRN  0.9 % sodium chloride infusion, 25 mL, IntraVENous, PRN  enoxaparin (LOVENOX) injection 40 mg, 40 mg, SubCUTAneous, Daily  ondansetron (ZOFRAN-ODT) disintegrating tablet 4 mg, 4 mg, Oral, Q8H PRN **OR** ondansetron (ZOFRAN) injection 4 mg, 4 mg, IntraVENous, Q6H PRN  polyethylene glycol (GLYCOLAX) packet 17 g, 17 g, Oral, Daily PRN  acetaminophen (TYLENOL) tablet 650 mg, 650 mg, Oral, Q6H PRN **OR** acetaminophen (TYLENOL) suppository 650 mg, 650 mg, Rectal, Q6H PRN  insulin lispro (HUMALOG) injection vial 0-12 Units, 0-12 Units, SubCUTAneous, TID WC  insulin lispro (HUMALOG) injection vial 0-6 Units, 0-6 Units, SubCUTAneous, Nightly  glucagon (rDNA) injection 1 mg, 1 mg, IntraMUSCular, PRN  dextrose 5 % solution, 100 mL/hr, IntraVENous, PRN  potassium chloride (KLOR-CON) extended release tablet 10 mEq, 10 mEq, Oral, Daily  bumetanide (BUMEX) tablet 1 mg, 1 mg, Oral, Daily  lisinopril (PRINIVIL;ZESTRIL) tablet 20 mg, 20 mg, Oral, Daily  gabapentin (NEURONTIN) capsule 400 mg, 400 mg, Oral, TID  ALPRAZolam (XANAX) tablet 0.25 mg, 0.25 mg, Oral, Nightly PRN  atorvastatin (LIPITOR) tablet 20 mg, 20 mg, Oral, Daily  pantoprazole (PROTONIX) tablet 40 mg, 40 mg, Oral, QAM AC  oxyCODONE-acetaminophen (PERCOCET) 7.5-325 MG per tablet 1 tablet, 1 tablet, Oral, BID  DULoxetine (CYMBALTA) extended release capsule 30 mg, 30 mg, Oral, Daily  hydrOXYzine (ATARAX) tablet 25 mg, 25 mg, Oral, TID PRN  vitamin B-12 (CYANOCOBALAMIN) tablet 1,000 mcg, 1,000 mcg, Oral, Daily  glucose chewable tablet 4 each, 4 tablet, Oral, PRN  dextrose bolus (hypoglycemia) 10% 125 mL, 125 mL, IntraVENous, PRN    Objective     BP (!) 157/66   Pulse 81   Temp 98.2 °F (36.8 °C) (Oral)   Resp 18   Ht 5' 5\" (1.651 m)   Wt 203 lb 0.7 oz (92.1 kg)   SpO2 95%   BMI 33.79 kg/m²      I/O:    Intake/Output Summary (Last 24 hours) at 3/16/2022 0756  Last data filed at 3/16/2022 0630  Gross per 24 hour   Intake 3473.07 ml   Output 1200 ml   Net 2273.07 ml              Wt Readings from Last 3 Encounters:   03/15/22 203 lb 0.7 oz (92.1 kg)   02/04/21 198 lb (89.8 kg)       LABS:    Recent Labs     03/15/22  0624 03/16/22  0548   WBC 7.0 7.1   HGB 8.8* 8.8*   HCT 30.2* 29.6*    238        Recent Labs     03/16/22  0548      K 4.6      CO2 22*   BUN 33*   CREATININE 1.8*      No results for input(s): PROT, INR, APTT in the last 72 hours.    No results for input(s): CKTOTAL, CKMB, CKMBINDEX, TROPONINI in the last 72 hours. Focused Lower Extremity Examination:    Vitals:    BP (!) 157/66   Pulse 81   Temp 98.2 °F (36.8 °C) (Oral)   Resp 18   Ht 5' 5\" (1.651 m)   Wt 203 lb 0.7 oz (92.1 kg)   SpO2 95%   BMI 33.79 kg/m²      Physical Exam:   Vascular: Dorsalis pedis and posterior tibial pulses are easily palpable bilaterally. Skin temperature is warm to warm from proximal tibial tuberosity to distal digits bilateral. CFT brisk to exposed digits. Edema present and nonpitting to left foot. Hair growth absent. Quality of skin within normal limits     Dermatologic: Nails 1-5 bilaterally are thickened, elongated and dystrophic, with presence of subungual debris, excepting left 2nd digit. Superficial thickness wound to the level of the dermis noted to posterior aspect of left heel, measuring 6cm x 3cm x 0.1cm. [address] of non-blanchable hyperpigmentation consistent with heel decubitus ulcer noted. There is no crepitus, surrounding erythema, or purulence noted.      Neurovascular: Epicritic and protopathic sensation diminished bilateral.     Musculoskeletal: Muscle strength 5/5 for all plantarflexors, dorsiflexors, inverters and everters examined. Mild tenderness to palpation of left heel wound. 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      IMAGING:  XR, left foot  Impression   Impression:   No radiographic findings of osteomyelitis.    See above for acute soft tissue findings and chronic    senescent/degenerative changes.       This document has been electronically signed by: Dillon Sanders MD on         MRI, Left foot: Pending    ASSESSMENT: Pt. is a 67 y.o. female with:  Principle  Heel decubitus ulcer, left      Chronic  Patient Active Problem List   Diagnosis    Cellulitis    Chronic kidney disease    Diabetes mellitus ([address]    Hypertension    Acute osteomyelitis of toe of left foot ([address]    Heel ulcer due to secondary DM ([address]    Osteomyelitis of foot, right, acute ([facility] [address]    Osteomyelitis of foot, left, acute ([address]       PLAN:   1. Decubitus ulcer, unstageable, left heel  -Patient was examined and evaluated  -Reviewed CBC, BMP  -Reviewed XR 3 views left foot; no changes consistent with osteomyelitis were seen on radiographs  -Ordered MRI  -Discussed with the patient that she likely does not have a bone infection; we will order MRI to rule bone infection out. If MRI is negative, she can discharge and follow up outpatient  -Ordered Prevalon boots; patient to wear at all times while in bed.  -Ordered surgical shoe  Patient may weight-bear as tolerated in surgical shoe  -Patient on IV Rocephin per ID  Aerobic and anaerobic culture taken; no growth preliminary results  Consult placed to hospitalist for preoperative or stratification and medical management  PT and OT consult placed for gait training  Consult placed to case management for discharge planning  Podiatry will continue to follow this patient     2. Diabetes mellitus  Discontinue home regimen for now, will manage her with sliding scale. Consult placed to hospitalist  Hypoglycemia protocol in place     3. CKD  -Continue home medications  Consult placed to hospitalist for management     4. Hypertension  -Continue home medications  -Blood pressure is 136/68 on admission  Consult placed to hospitalist for management        DISPO: Pending cultures, MRI left foot.  Patient likely to discharge on 3/17/2022; she can follow up with Dr. Hi Caruso on an outpatient bases for further care.     Thanks for the opportunity to participate in this patient's care.      Charlee Pace, [address] DPM, PGY-2  Podiatric Surgical Resident  3/16/2022   7:56 AM

## 2022-03-15 NOTE — CONSULTS
(60 gm/meal)    Review of systems:   Pertinent positives as noted in the HPI. All other systems reviewed and negative. PHYSICAL EXAM:  /70   Pulse 82   Temp 98 °F (36.7 °C) (Oral)   Resp 18   SpO2 99%   General appearance: No apparent distress, appears stated age and cooperative. HEENT: Normal cephalic, atraumatic without obvious deformity. Pupils equal, round, and reactive to light. Extra ocular muscles intact. Conjunctivae/corneas clear. Neck: Supple, with full range of motion. No jugular venous distention. Trachea midline. Respiratory:  Normal respiratory effort. Clear to auscultation, bilaterally without Rales/Wheezes/Rhonchi. Cardiovascular: Regular rate and rhythm with normal S1/S2 without murmurs, rubs or gallops. Abdomen: Soft, non-tender, non-distended with normal bowel sounds. Musculoskeletal: Left heel ulcer and right shin ulcer noted. .  Skin: Skin color, texture, turgor normal.  No rashes or lesions. Neurologic:  Neurovascularly intact without any focal sensory/motor deficits. Cranial nerves: II-XII intact, grossly non-focal.  Psychiatric: Alert and oriented, thought content appropriate, normal insight  Capillary Refill: Brisk,< 3 seconds   Peripheral Pulses: +2 palpable, equal bilaterally     Labs:   No results for input(s): WBC, HGB, HCT, PLT in the last 72 hours. No results for input(s): NA, K, CL, CO2, BUN, CREATININE, CALCIUM, PHOS in the last 72 hours. Invalid input(s): MAGNES  No results for input(s): AST, ALT, BILIDIR, BILITOT, ALKPHOS in the last 72 hours. No results for input(s): INR in the last 72 hours. No results for input(s): Sonjia Orin in the last 72 hours. Urinalysis:    No results found for: Sueanne Bicker, BACTERIA, RBCUA, BLOODU, SPECGRAV, [facility] [address]    Radiology:   XR FOOT LEFT (MIN 3 VIEWS)   Final Result   Impression:   No radiographic findings of osteomyelitis.    See above for acute soft tissue findings and chronic    senescent/degenerative changes. This document has been electronically signed by: Jessica Aaron MD on    03/15/2022 12:17 AM        No results found.           [address] YOU FOR THE CONSULT    Electronically signed by Brandy Tyler DO on 3/15/2022 at 12:22 AM

## 2022-03-15 NOTE — PROGRESS NOTES
Pt admitted to  [id number] per podiatry. Complains of L heel ulcer Instructed in use of call light, tv controls, bed controls and 5 minute rule scripted to pt with understanding verbalized. Fall and safety brochure discussed with pt.

## 2022-03-15 NOTE — CARE COORDINATION
3/15/22, 7:26 AM EDT  DISCHARGE PLANNING EVALUATION:    Kera Pena       Admitted: 3/14/2022/ 1454 [facility] 2050 day: 1   Location: [address] [place] [address]A Reason for admit: Heel ulcer due to secondary DM ([address] [E13.621, L97.409]  Osteomyelitis of foot, right, acute ([address] [M86.171]  Osteomyelitis of foot, left, acute ([address] [M86.172]   PMH:  has a past medical history of Acute osteomyelitis of toe of left foot ([address] Arthritis, Blood circulation, collateral, Chronic kidney disease, Diabetes mellitus ([address] and Hypertension. Procedure:   Left foot xray No radiographic findings of osteomyelitis. See above for acute soft tissue findings and chronic   senescent/degenerative changes. Barriers to Discharge:  Direct admit, Hgb 8.8, diabetes management, ID, Hospitalist consult, PT, IV fluids, Bumex, Lovenox, Protonix, IV Zosyn. PCP: Faustino Julio  Readmission Risk Score: 9 ( )%    Patient Goals/Plan/Treatment Preferences: Met with Julienne. She currently lives at home with her daughter. She is independent, she uses a walker. Plan is to return home at discharge. She denies need for DME and declines HH. Will follow for potential needs or services. Transportation/Food Security/Housekeeping Addressed:  No issues identified.

## 2022-03-16 LAB
ANION GAP SERPL CALCULATED.3IONS-SCNC: 13 MEQ/L (ref 8–16)
BUN BLDV-MCNC: 33 MG/DL (ref 7–22)
CALCIUM SERPL-MCNC: 8.6 MG/DL (ref 8.5–10.5)
CHLORIDE BLD-SCNC: 108 MEQ/L (ref 98–111)
CO2: 22 MEQ/L (ref 23–33)
CREAT SERPL-MCNC: 1.8 MG/DL (ref 0.4–1.2)
ERYTHROCYTE [DISTWIDTH] IN BLOOD BY AUTOMATED COUNT: 14.7 % (ref 11.5–14.5)
ERYTHROCYTE [DISTWIDTH] IN BLOOD BY AUTOMATED COUNT: 50.5 FL (ref 35–45)
GFR SERPL CREATININE-BSD FRML MDRD: 27 ML/MIN/1.73M2
GLUCOSE BLD-MCNC: 112 MG/DL (ref 70–108)
GLUCOSE BLD-MCNC: 151 MG/DL (ref 70–108)
GLUCOSE BLD-MCNC: 157 MG/DL (ref 70–108)
GLUCOSE BLD-MCNC: 165 MG/DL (ref 70–108)
HCT VFR BLD CALC: 29.6 % (ref 37–47)
HEMOGLOBIN: 8.8 GM/DL (ref 12–16)
MCH RBC QN AUTO: 27.5 PG (ref 26–33)
MCHC RBC AUTO-ENTMCNC: 29.7 GM/DL (ref 32.2–35.5)
MCV RBC AUTO: 92.5 FL (ref 81–99)
PLATELET # BLD: 238 THOU/MM3 (ref 130–400)
PMV BLD AUTO: 11.2 FL (ref 9.4–12.4)
POTASSIUM SERPL-SCNC: 4.6 MEQ/L (ref 3.5–5.2)
RBC # BLD: 3.2 MILL/MM3 (ref 4.2–5.4)
SODIUM BLD-SCNC: 143 MEQ/L (ref 135–145)
WBC # BLD: 7.1 THOU/MM3 (ref 4.8–10.8)

## 2022-03-16 PROCEDURE — 6370000000 HC RX 637 (ALT 250 FOR IP): Performed by: PODIATRIST

## 2022-03-16 PROCEDURE — 1200000000 HC SEMI PRIVATE

## 2022-03-16 PROCEDURE — 2580000003 HC RX 258: Performed by: PODIATRIST

## 2022-03-16 PROCEDURE — 82948 REAGENT STRIP/BLOOD GLUCOSE: CPT

## 2022-03-16 PROCEDURE — 6360000002 HC RX W HCPCS: Performed by: STUDENT IN AN ORGANIZED HEALTH CARE EDUCATION/TRAINING PROGRAM

## 2022-03-16 PROCEDURE — 36415 COLL VENOUS BLD VENIPUNCTURE: CPT

## 2022-03-16 PROCEDURE — 80048 BASIC METABOLIC PNL TOTAL CA: CPT

## 2022-03-16 PROCEDURE — 2580000003 HC RX 258: Performed by: STUDENT IN AN ORGANIZED HEALTH CARE EDUCATION/TRAINING PROGRAM

## 2022-03-16 PROCEDURE — 85027 COMPLETE CBC AUTOMATED: CPT

## 2022-03-16 PROCEDURE — 6360000002 HC RX W HCPCS

## 2022-03-16 PROCEDURE — 6370000000 HC RX 637 (ALT 250 FOR IP)

## 2022-03-16 RX ADMIN — Medication 1000 MCG: at 09:13

## 2022-03-16 RX ADMIN — GABAPENTIN 400 MG: 400 CAPSULE ORAL at 15:21

## 2022-03-16 RX ADMIN — ENOXAPARIN SODIUM 30 MG: 100 INJECTION SUBCUTANEOUS at 09:13

## 2022-03-16 RX ADMIN — CEFTRIAXONE SODIUM 1000 MG: 1 INJECTION, POWDER, FOR SOLUTION INTRAMUSCULAR; INTRAVENOUS at 11:43

## 2022-03-16 RX ADMIN — DULOXETINE HYDROCHLORIDE 30 MG: 30 CAPSULE, DELAYED RELEASE ORAL at 09:13

## 2022-03-16 RX ADMIN — OXYCODONE HYDROCHLORIDE AND ACETAMINOPHEN 1 TABLET: 7.5; 325 TABLET ORAL at 09:13

## 2022-03-16 RX ADMIN — ALPRAZOLAM 0.25 MG: 0.25 TABLET ORAL at 22:06

## 2022-03-16 RX ADMIN — GABAPENTIN 400 MG: 400 CAPSULE ORAL at 09:13

## 2022-03-16 RX ADMIN — POTASSIUM CHLORIDE 10 MEQ: 750 TABLET, EXTENDED RELEASE ORAL at 09:12

## 2022-03-16 RX ADMIN — SODIUM CHLORIDE: 9 INJECTION, SOLUTION INTRAVENOUS at 02:34

## 2022-03-16 RX ADMIN — BUMETANIDE 1 MG: 1 TABLET ORAL at 09:13

## 2022-03-16 RX ADMIN — ATORVASTATIN CALCIUM 20 MG: 20 TABLET, FILM COATED ORAL at 22:06

## 2022-03-16 RX ADMIN — GABAPENTIN 400 MG: 400 CAPSULE ORAL at 22:06

## 2022-03-16 RX ADMIN — OXYCODONE HYDROCHLORIDE AND ACETAMINOPHEN 1 TABLET: 7.5; 325 TABLET ORAL at 23:11

## 2022-03-16 RX ADMIN — SODIUM CHLORIDE: 9 INJECTION, SOLUTION INTRAVENOUS at 15:21

## 2022-03-16 RX ADMIN — LISINOPRIL 20 MG: 20 TABLET ORAL at 09:13

## 2022-03-16 RX ADMIN — PANTOPRAZOLE SODIUM 40 MG: 40 TABLET, DELAYED RELEASE ORAL at 06:20

## 2022-03-16 ASSESSMENT — PAIN DESCRIPTION - FREQUENCY
FREQUENCY: INTERMITTENT
FREQUENCY: INTERMITTENT
FREQUENCY: CONTINUOUS

## 2022-03-16 ASSESSMENT — PAIN DESCRIPTION - PROGRESSION
CLINICAL_PROGRESSION: NOT CHANGED

## 2022-03-16 ASSESSMENT — PAIN DESCRIPTION - PAIN TYPE
TYPE: CHRONIC PAIN

## 2022-03-16 ASSESSMENT — PAIN DESCRIPTION - DESCRIPTORS
DESCRIPTORS: ACHING

## 2022-03-16 ASSESSMENT — PAIN DESCRIPTION - ONSET
ONSET: ON-GOING

## 2022-03-16 ASSESSMENT — PAIN DESCRIPTION - LOCATION: LOCATION: GENERALIZED

## 2022-03-16 ASSESSMENT — PAIN SCALES - GENERAL
PAINLEVEL_OUTOF10: 5
PAINLEVEL_OUTOF10: 6
PAINLEVEL_OUTOF10: 5
PAINLEVEL_OUTOF10: 6

## 2022-03-16 ASSESSMENT — PAIN - FUNCTIONAL ASSESSMENT: PAIN_FUNCTIONAL_ASSESSMENT: PREVENTS OR INTERFERES SOME ACTIVE ACTIVITIES AND ADLS

## 2022-03-16 NOTE — CARE COORDINATION
3/16/22, 8:00 AM EDT    DISCHARGE ON GOING EVALUATION    [facility] day: 2  Location: -57/057-A Reason for admit: Heel ulcer due to secondary DM ([address] [L59.772, L97.409]  Osteomyelitis of foot, right, acute ([facility] [M86.171]  Osteomyelitis of foot, left, acute ([facility] [M86.172]   Procedure: none  Barriers to Discharge: Hgb 8.8, foot culture (+) staphylococcus. Creatinine 1.8. ID, Hospitalist following. IV fluids, Lipitor, Bumex, IV Rocephin, diabetes management, Lovenox, Protonix, pain and nausea control. PCP: Lorene Rider  Readmission Risk Score: 11.3 ( )%  Patient Goals/Plan/Treatment Preferences: Plan is to return home with her daughter. Will follow for potential needs.

## 2022-03-16 NOTE — PROGRESS NOTES
Podiatric Progress Note  Adithya Sanford  Subjective :   3/16/2022  Patient seen at chair-side on behalf of Dr. Laurie Light. Patient is doing well, and in no acute distress. Patient states she think her wounds look similar to what it looked like previously, but not worse. Patient states that she just got the number for the  of her spinal cord stimulator, she is going to call him and see if she is able to have an MRI with her stimulator. Patient denies any nausea, vomiting, fever, chills, chest pain, shortness of breath. No other pedal complaints. 3/15/2022  Patient seen at chair side today on behalf of Dr. Laurie Light. Patient appeared pleasant, was oriented to person, place and time and in no acute distress. Patient relates minimal pain to the left posterior heel. She also denies any N/V/F/C/SOB/CP or bilateral calf tenderness. Patient is curious as to what the treatment plan is. Patient has no further pedal concerns at this point in time. HPI  The patient is a 67 y.o. female with significant past medical history of diabetes, hypertension, CKD who was seen by Dr. Laurie Light today in clinic. Patient is accompanied by her children. Patient relates that she has had a wound on her left heel but does not know how long it has been there. She has been neuropathic for a very long time. She denies any fever, chills, nausea, vomiting, chest pain or shortness of breath. Patient does have some discomfort to the left leg. She denies being on any blood thinners but does sit often. They had discussed with Dr. Laurie Light in clinic that she has infection in her bone and the best course of action would be admission for further workup.     Current Medications:    Current Facility-Administered Medications: enoxaparin (LOVENOX) injection 30 mg, 30 mg, SubCUTAneous, Daily  cefTRIAXone (ROCEPHIN) 1000 mg IVPB in 50 mL D5W minibag, 1,000 mg, IntraVENous, Q24H  0.9 % sodium chloride infusion, , IntraVENous, Continuous  sodium chloride flush 0.9 % injection 5-40 mL, 5-40 mL, IntraVENous, 2 times per day  sodium chloride flush 0.9 % injection 5-40 mL, 5-40 mL, IntraVENous, PRN  0.9 % sodium chloride infusion, 25 mL, IntraVENous, PRN  ondansetron (ZOFRAN-ODT) disintegrating tablet 4 mg, 4 mg, Oral, Q8H PRN **OR** ondansetron (ZOFRAN) injection 4 mg, 4 mg, IntraVENous, Q6H PRN  polyethylene glycol (GLYCOLAX) packet 17 g, 17 g, Oral, Daily PRN  acetaminophen (TYLENOL) tablet 650 mg, 650 mg, Oral, Q6H PRN **OR** acetaminophen (TYLENOL) suppository 650 mg, 650 mg, Rectal, Q6H PRN  insulin lispro (HUMALOG) injection vial 0-12 Units, 0-12 Units, SubCUTAneous, TID WC  insulin lispro (HUMALOG) injection vial 0-6 Units, 0-6 Units, SubCUTAneous, Nightly  glucagon (rDNA) injection 1 mg, 1 mg, IntraMUSCular, PRN  dextrose 5 % solution, 100 mL/hr, IntraVENous, PRN  potassium chloride (KLOR-CON) extended release tablet 10 mEq, 10 mEq, Oral, Daily  bumetanide (BUMEX) tablet 1 mg, 1 mg, Oral, Daily  lisinopril (PRINIVIL;ZESTRIL) tablet 20 mg, 20 mg, Oral, Daily  gabapentin (NEURONTIN) capsule 400 mg, 400 mg, Oral, TID  ALPRAZolam (XANAX) tablet 0.25 mg, 0.25 mg, Oral, Nightly PRN  atorvastatin (LIPITOR) tablet 20 mg, 20 mg, Oral, Daily  pantoprazole (PROTONIX) tablet 40 mg, 40 mg, Oral, QAM AC  oxyCODONE-acetaminophen (PERCOCET) 7.5-325 MG per tablet 1 tablet, 1 tablet, Oral, BID  DULoxetine (CYMBALTA) extended release capsule 30 mg, 30 mg, Oral, Daily  hydrOXYzine (ATARAX) tablet 25 mg, 25 mg, Oral, TID PRN  vitamin B-12 (CYANOCOBALAMIN) tablet 1,000 mcg, 1,000 mcg, Oral, Daily  glucose chewable tablet 4 each, 4 tablet, Oral, PRN  dextrose bolus (hypoglycemia) 10% 125 mL, 125 mL, IntraVENous, PRN    Objective     /65   Pulse 74   Temp 98 °F (36.7 °C) (Oral)   Resp 18   Ht 5' 5\" (1.651 m)   Wt 203 lb 0.7 oz (92.1 kg)   SpO2 95%   BMI 33.79 kg/m²      I/O:    Intake/Output Summary (Last 24 hours) at 3/16/2022 1643  Last data filed at 3/16/2022 1557  Gross per 24 hour   Intake 3593.07 ml   Output 1450 ml   Net 2143.07 ml              Wt Readings from Last 3 Encounters:   03/15/22 203 lb 0.7 oz (92.1 kg)   02/04/21 198 lb (89.8 kg)       LABS:    Recent Labs     03/15/22  0624 03/16/22  0548   WBC 7.0 7.1   HGB 8.8* 8.8*   HCT 30.2* 29.6*    238        Recent Labs     03/16/22  0548      K 4.6      CO2 22*   BUN 33*   CREATININE 1.8*      No results for input(s): PROT, INR, APTT in the last 72 hours. No results for input(s): CKTOTAL, CKMB, CKMBINDEX, TROPONINI in the last 72 hours. Focused Lower Extremity Examination:    Vitals:    /65   Pulse 74   Temp 98 °F (36.7 °C) (Oral)   Resp 18   Ht 5' 5\" (1.651 m)   Wt 203 lb 0.7 oz (92.1 kg)   SpO2 95%   BMI 33.79 kg/m²      Physical Exam:   Vascular: Dorsalis pedis and posterior tibial pulses are easily palpable bilaterally. Skin temperature is warm to warm from proximal tibial tuberosity to distal digits bilateral. CFT brisk to exposed digits. Edema present and nonpitting to left foot. Hair growth absent. Quality of skin within normal limits     Dermatologic: Nails 1-5 bilaterally are thickened, elongated and dystrophic, with presence of subungual debris, excepting left 2nd digit. Superficial thickness wound to the level of the dermis noted to posterior aspect of left heel, measuring 6cm x 3cm x 0.1cm. [address] of non-blanchable hyperpigmentation consistent with heel decubitus ulcer noted. There is no crepitus, surrounding erythema, or purulence noted.      Neurovascular: Epicritic and protopathic sensation diminished bilateral.     Musculoskeletal: Muscle strength 5/5 for all plantarflexors, dorsiflexors, inverters and everters examined. Mild tenderness to palpation of left heel wound. 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              IMAGING:  XR, left foot  Impression   Impression:   No radiographic findings of osteomyelitis.    See above for acute soft tissue findings and chronic    senescent/degenerative changes.       This document has been electronically signed by: Su Juan MD on         MRI, Left foot: Pending    ASSESSMENT: Pt. is a 67 y.o. female with:  Principle  Heel decubitus ulcer, left      Chronic  Patient Active Problem List   Diagnosis    Cellulitis    Chronic kidney disease    Diabetes mellitus ([address]    Hypertension    Acute osteomyelitis of toe of left foot ([address]    Heel ulcer due to secondary DM ([address]    Osteomyelitis of foot, right, acute ([address]    Osteomyelitis of foot, left, acute ([address]       PLAN:   1. Decubitus ulcer, unstageable, left heel  -Patient was examined and evaluated  -WBC 7.1  -Reviewed XR 3 views left foot; no changes consistent with osteomyelitis were seen on radiographs  -Ordered MRI - pending finding out if patient can undergo this with her spinal cord stimulator  -Discussed with the patient that she likely does not have a bone infection; we will order MRI to rule bone infection out. If MRI is negative, she can discharge and follow up outpatient  -Ordered Prevalon boots; patient to wear at all times while in bed.  -Ordered surgical shoe  Patient may weight-bear as tolerated in surgical shoe  -Patient on IV Rocephin per ID  Aerobic and anaerobic culture taken; preliminary results yield coagulase positive staphylococcus, pending sensitivity  Consult placed to hospitalist for preoperative or stratification and medical management  PT and OT consult placed for gait training  Consult placed to case management for discharge planning  Podiatry will continue to follow this patient     2. Diabetes mellitus  Discontinue home regimen for now, will manage her with sliding scale. Consult placed to hospitalist  Hypoglycemia protocol in place     3. CKD  -Continue home medications  Consult placed to hospitalist for management     4.   Hypertension  -Continue home medications  -Blood pressure is 136/68 on admission  Consult placed to hospitalist for management        DISPO: Pending cultures, MRI left foot. Patient likely to discharge on 3/17/2022; she can follow up with Dr. Rianna Iyer on an outpatient bases for further care.     Thanks for the opportunity to participate in this patient's care.      Som Russell DPM, PGY-2  Podiatric Surgical Resident  3/16/2022   4:43 PM

## 2022-03-16 NOTE — PROGRESS NOTES
Progress note: Infectious diseases    Patient - Kera Pena,  Age - 67 y.o.    - 1945      Room Number - 6E-57/057-A   MRN -  [id number]   Acct # - [de-identified]  Date of Admission -  3/14/2022  7:56 PM    SUBJECTIVE:   No new issues. OBJECTIVE   VITALS    height is 5' 5\" (1.651 m) and weight is 203 lb 0.7 oz (92.1 kg). Her oral temperature is 99.2 °F (37.3 °C). Her blood pressure is 161/66 (abnormal) and her pulse is 87. Her respiration is 18 and oxygen saturation is 95%. Wt Readings from Last 3 Encounters:   03/15/22 203 lb 0.7 oz (92.1 kg)   21 198 lb (89.8 kg)       I/O (24 Hours)    Intake/Output Summary (Last 24 hours) at 3/16/2022 1518  Last data filed at 3/16/2022 1144  Gross per 24 hour   Intake 3353.07 ml   Output 1150 ml   Net 2203.07 ml       General Appearance  Awake, alert, oriented,   HEENT - normocephalic, atraumatic,slighlty pale  conjunctiva,  anicteric sclera  Neck - Supple, no mass  Lungs -  Bilateral   air entry, no rhonchi, no wheeze  Cardiovascular - Heart sounds are normal.    Abdomen - soft, not distended, nontender,   Neurologic -oriented  Skin - No bruising or bleeding  Extremities - + edema,dressed left foot wound.     MEDICATIONS:      enoxaparin  30 mg SubCUTAneous Daily    cefTRIAXone (ROCEPHIN) IV  1,000 mg IntraVENous Q24H    sodium chloride flush  5-40 mL IntraVENous 2 times per day    insulin lispro  0-12 Units SubCUTAneous TID WC    insulin lispro  0-6 Units SubCUTAneous Nightly    potassium chloride  10 mEq Oral Daily    bumetanide  1 mg Oral Daily    lisinopril  20 mg Oral Daily    gabapentin  400 mg Oral TID    atorvastatin  20 mg Oral Daily    pantoprazole  40 mg Oral QAM AC    oxyCODONE-acetaminophen  1 tablet Oral BID    DULoxetine  30 mg Oral Daily    vitamin B-12  1,000 mcg Oral Daily      sodium chloride 75 mL/hr at 22 0630    sodium chloride      dextrose       sodium chloride flush, sodium chloride, ondansetron **OR** ondansetron, polyethylene glycol, acetaminophen **OR** acetaminophen, glucagon (rDNA), dextrose, ALPRAZolam, hydrOXYzine, glucose, dextrose bolus (hypoglycemia)      LABS:     CBC:   Recent Labs     03/15/22  0624 03/16/22  0548   WBC 7.0 7.1   HGB 8.8* 8.8*    238     BMP:    Recent Labs     03/15/22  1043 03/16/22  0548   NA  --  143   K  --  4.6   CL  --  108   CO2  --  22*   BUN  --  33*   CREATININE 1.7* 1.8*   GLUCOSE  --  112*     Calcium:  Recent Labs     03/16/22  0548   CALCIUM 8.6      Recent Labs     03/15/22  1640 03/15/22  1955 03/16/22  1122   POCGLU 112* 147* 157*        CULTURES:   UA: No results for input(s): SPECGRAV, PHUR, COLORU, CLARITYU, MUCUS, PROTEINU, BLOODU, RBCUA, WBCUA, BACTERIA, NITRU, GLUCOSEU, BILIRUBINUR, UROBILINOGEN, KETUA, LABCAST, LABCASTTY, AMORPHOS in the last 72 hours.     Invalid input(s): CRYSTALS  Micro: No results found for: BC       Problem list of patient:     Patient Active Problem List   Diagnosis Code    Cellulitis L03.90    Chronic kidney disease N18.9    Diabetes mellitus ([address] E11.9    Hypertension I10    Acute osteomyelitis of toe of left foot ([address] M86.172    Heel ulcer due to secondary DM ([address] E13.621, L97.409    Osteomyelitis of foot, right, acute ([address] M86.171    Osteomyelitis of foot, left, acute ([address] M86.172         ASSESSMENT/PLAN   Left foot diabetic ulcer:    CKD  DM with neuropathy  Continue current treatment      Monet Hall MD, MD, FACP 3/16/2022 3:18 PM

## 2022-03-16 NOTE — PLAN OF CARE
Problem: Pain:  Goal: Pain level will decrease  Description: Pain level will decrease  Outcome: Ongoing  Note: Patient has chronic pain and is taking neurontin BID   Goal: Control of acute pain  Description: Control of acute pain  Outcome: Ongoing  Note: Patient has chronic pain and is taking neurontin BID   Goal: Control of chronic pain  Description: Control of chronic pain  Outcome: Ongoing  Note: Patient has chronic pain and is taking neurontin BID      Problem: Neurological  Goal: Maximum potential motor/sensory/cognitive function  Outcome: Ongoing  Note: Patient uses a walker and walking boot to ambulate,  tolerates well      Problem: Cardiovascular  Goal: No DVT, peripheral vascular complications  Outcome: Met This Shift  Note: Patient has no signs/symptoms of DVT at this time,  taking lovenox   Goal: Hemodynamic stability  Outcome: Met This Shift     Problem: Skin Integrity/Risk  Goal: No skin breakdown during hospitalization  Outcome: Met This Shift  Note: Patient does not have any new skin breakdown this stay   Goal: Wound healing  Outcome: Ongoing  Note: Patient has a scab on right shin and left heel wound that are in the healing process      Problem: Discharge Planning:  Goal: Patients continuum of care needs are met  Description: Patients continuum of care needs are met  Outcome: Ongoing  Note: No discharge plan yet,  will continue to monitor for discharge needs      Problem: Skin Integrity:  Goal: Will show no infection signs and symptoms  Description: Will show no infection signs and symptoms  Outcome: Ongoing  Note: Patient has a scab on right shin and a left heel wound,  no fevers this shift   Goal: Absence of new skin breakdown  Description: Absence of new skin breakdown  Outcome: Met This Shift  Note: Patient does not have any new skin breakdown this shift    Care plan reviewed with patient. Patient verbalize understanding of the plan of care and contribute to goal setting.

## 2022-03-16 NOTE — PROGRESS NOTES
Patient's spinal stimulator is not fully charged at this and her charging system is at home an hour a way.   MRI cannot be done during this stay per podiatry,  Will possibly do it as an outpatient

## 2022-03-17 VITALS
DIASTOLIC BLOOD PRESSURE: 62 MMHG | OXYGEN SATURATION: 97 % | SYSTOLIC BLOOD PRESSURE: 131 MMHG | TEMPERATURE: 97.3 F | HEART RATE: 71 BPM | BODY MASS INDEX: 33.83 KG/M2 | HEIGHT: 65 IN | WEIGHT: 203.04 LBS | RESPIRATION RATE: 16 BRPM

## 2022-03-17 LAB
AEROBIC CULTURE: ABNORMAL
AEROBIC CULTURE: ABNORMAL
ANAEROBIC CULTURE: ABNORMAL
ANION GAP SERPL CALCULATED.3IONS-SCNC: 15 MEQ/L (ref 8–16)
BUN BLDV-MCNC: 32 MG/DL (ref 7–22)
CALCIUM SERPL-MCNC: 9.4 MG/DL (ref 8.5–10.5)
CHLORIDE BLD-SCNC: 107 MEQ/L (ref 98–111)
CO2: 21 MEQ/L (ref 23–33)
CREAT SERPL-MCNC: 1.7 MG/DL (ref 0.4–1.2)
ERYTHROCYTE [DISTWIDTH] IN BLOOD BY AUTOMATED COUNT: 14.6 % (ref 11.5–14.5)
ERYTHROCYTE [DISTWIDTH] IN BLOOD BY AUTOMATED COUNT: 47 FL (ref 35–45)
GFR SERPL CREATININE-BSD FRML MDRD: 29 ML/MIN/1.73M2
GLUCOSE BLD-MCNC: 109 MG/DL (ref 70–108)
GLUCOSE BLD-MCNC: 120 MG/DL (ref 70–108)
GRAM STAIN RESULT: ABNORMAL
HCT VFR BLD CALC: 34.2 % (ref 37–47)
HEMOGLOBIN: 10.4 GM/DL (ref 12–16)
MCH RBC QN AUTO: 27.2 PG (ref 26–33)
MCHC RBC AUTO-ENTMCNC: 30.4 GM/DL (ref 32.2–35.5)
MCV RBC AUTO: 89.3 FL (ref 81–99)
ORGANISM: ABNORMAL
PLATELET # BLD: 288 THOU/MM3 (ref 130–400)
PMV BLD AUTO: 10.8 FL (ref 9.4–12.4)
POTASSIUM SERPL-SCNC: 4.5 MEQ/L (ref 3.5–5.2)
RBC # BLD: 3.83 MILL/MM3 (ref 4.2–5.4)
SODIUM BLD-SCNC: 143 MEQ/L (ref 135–145)
WBC # BLD: 7.6 THOU/MM3 (ref 4.8–10.8)

## 2022-03-17 PROCEDURE — 97116 GAIT TRAINING THERAPY: CPT

## 2022-03-17 PROCEDURE — 97530 THERAPEUTIC ACTIVITIES: CPT

## 2022-03-17 PROCEDURE — 6370000000 HC RX 637 (ALT 250 FOR IP): Performed by: PODIATRIST

## 2022-03-17 PROCEDURE — 85027 COMPLETE CBC AUTOMATED: CPT

## 2022-03-17 PROCEDURE — 97162 PT EVAL MOD COMPLEX 30 MIN: CPT

## 2022-03-17 PROCEDURE — 6360000002 HC RX W HCPCS

## 2022-03-17 PROCEDURE — 82948 REAGENT STRIP/BLOOD GLUCOSE: CPT

## 2022-03-17 PROCEDURE — 36415 COLL VENOUS BLD VENIPUNCTURE: CPT

## 2022-03-17 PROCEDURE — 80048 BASIC METABOLIC PNL TOTAL CA: CPT

## 2022-03-17 RX ORDER — CLINDAMYCIN HYDROCHLORIDE 300 MG/1
300 CAPSULE ORAL 3 TIMES DAILY
Qty: 21 CAPSULE | Refills: 0 | Status: SHIPPED | OUTPATIENT
Start: 2022-03-17 | End: 2022-03-24

## 2022-03-17 RX ADMIN — BUMETANIDE 1 MG: 1 TABLET ORAL at 08:22

## 2022-03-17 RX ADMIN — PANTOPRAZOLE SODIUM 40 MG: 40 TABLET, DELAYED RELEASE ORAL at 05:13

## 2022-03-17 RX ADMIN — Medication 1000 MCG: at 08:22

## 2022-03-17 RX ADMIN — ENOXAPARIN SODIUM 30 MG: 100 INJECTION SUBCUTANEOUS at 08:22

## 2022-03-17 RX ADMIN — POTASSIUM CHLORIDE 10 MEQ: 750 TABLET, EXTENDED RELEASE ORAL at 08:21

## 2022-03-17 RX ADMIN — DULOXETINE HYDROCHLORIDE 30 MG: 30 CAPSULE, DELAYED RELEASE ORAL at 08:22

## 2022-03-17 RX ADMIN — GABAPENTIN 400 MG: 400 CAPSULE ORAL at 08:22

## 2022-03-17 RX ADMIN — OXYCODONE HYDROCHLORIDE AND ACETAMINOPHEN 1 TABLET: 7.5; 325 TABLET ORAL at 08:21

## 2022-03-17 ASSESSMENT — PAIN SCALES - GENERAL: PAINLEVEL_OUTOF10: 5

## 2022-03-17 ASSESSMENT — PAIN DESCRIPTION - PAIN TYPE: TYPE: CHRONIC PAIN

## 2022-03-17 ASSESSMENT — PAIN DESCRIPTION - ORIENTATION: ORIENTATION: RIGHT;LEFT

## 2022-03-17 ASSESSMENT — PAIN DESCRIPTION - LOCATION: LOCATION: SHOULDER

## 2022-03-17 NOTE — DISCHARGE SUMMARY
PODIATRIC SURGERY DISCHARGE  Patient ID:  Gisell Ribera  [id number]    Physician: Jony Olson DPM     Admition date: 3/14/2022    Discharge date: 3/17/2022    Date of Surgery: N/A    Admission Diagnoses: Heel ulcer due to secondary DM ([facility] [E13.621, L97.409]  Osteomyelitis of foot, right, acute ([facility] [M86.171]  Osteomyelitis of foot, left, acute ([facility] [K05.729]    Discharge Diagnoses: Heel ulcer due to secondary DM ([facility] [Z10.159, L97.409]    Procedures: None    History, Physical Exam:  HPI  The patient is a 74 y. o. female with significant past medical history of diabetes, hypertension, CKD who was seen by Dr. Nilsa Paz today in clinic. Zenovia Phlegm is accompanied by her children. Zenovia Phlegm relates that she has had a wound on her left heel but does not know how long it has been there.  She has been neuropathic for a very long time.  She denies any fever, chills, nausea, vomiting, chest pain or shortness of breath.  Patient does have some discomfort to the left leg.  She denies being on any blood thinners but does sit often.  They had discussed with Dr. Nilsa Paz in clinic that she has infection in her bone and the best course of action would be admission for further workup. Physical Exam:   Vascular: Dorsalis pedis and posterior tibial pulses are easily palpable bilaterally. Skin temperature is warm to warm from proximal tibial tuberosity to distal digits bilateral. CFT brisk to exposed digits. Edema present and nonpitting to left foot. Hair growth absent. Quality of skin within normal limits     Dermatologic: Nails 1-5 bilaterally are thickened, elongated and dystrophic, with presence of subungual debris, excepting left 2nd digit. Superficial thickness wound to the level of the dermis noted to posterior aspect of left heel, measuring 6cm x 3cm x 0.1cm. [address] of non-blanchable hyperpigmentation consistent with heel decubitus ulcer noted. There is no crepitus, surrounding erythema, or purulence noted.    Neurovascular: Epicritic and protopathic sensation diminished bilateral.     Musculoskeletal: Muscle strength 5/5 for all plantarflexors, dorsiflexors, inverters and everters examined.  Mild tenderness to palpation of left heel wound.  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     Discharge Physician: Steven Hollingsworth. [address] Course:  3/14/2022: Patient admitted for IV abx and possible osteomyeltis of left calcaneus workup  3/15/2022: Patient on IV Rocephin per ID; no radiographic signs of osteomyelitis  3/16/2022: Discussed with patient getting MRI while inpatient to rule out osteomyelitis; MRI pending clearance as patient has implanted spine stimulator; discussed with the patient that if unable to get MRI as inpatient, we may arrange for outpatient MRI. Patient amenable. 3/17/2022: After discussing with the patient, patient was discharged as she wanted to go home and would like to have MRI as outpatient. Consults: ID and internal medicine    Discharged Condition: fair    Disposition: home    Patient Instructions:   Current Discharge Medication List      CONTINUE these medications which have NOT CHANGED    Details   potassium chloride (MICRO-K) 10 MEQ extended release capsule Take 10 mEq by mouth daily      bumetanide (BUMEX) 1 MG tablet Take 1 mg by mouth daily      benazepril (LOTENSIN) 20 MG tablet Take 20 mg by mouth daily      gabapentin (NEURONTIN) 400 MG capsule Take 400 mg by mouth 3 times daily. glipiZIDE (GLUCOTROL) 5 MG tablet Take 5 mg by mouth daily Takes at lunch      metFORMIN (GLUCOPHAGE) 500 MG tablet Take 500 mg by mouth daily Takes at lunch      ALPRAZolam (XANAX) 0.25 MG tablet Take 0.25 mg by mouth nightly as needed for Sleep.      simvastatin (ZOCOR) 40 MG tablet Take 40 mg by mouth nightly      omeprazole (PRILOSEC) 40 MG delayed release capsule Take 40 mg by mouth daily      oxyCODONE-acetaminophen (PERCOCET) 7.5-325 MG per tablet Take 1 tablet by mouth 2 times daily. DULoxetine (CYMBALTA) 30 MG extended release capsule Take 30 mg by mouth daily      hydrOXYzine (ATARAX) 25 MG tablet Take 25 mg by mouth 3 times daily as needed for Itching (Sleep)      vitamin B-12 (CYANOCOBALAMIN) 1000 MCG tablet Take 1,000 mcg by mouth daily      Cholecalciferol (VITAMIN D3) 1.25 MG (59733 UT) CAPS Take by mouth           Activity: activity as tolerated  Diet: regular diet  Wound Care: keep wound clean and dry and apply Betadine and DSD; change once daily    Follow-up with Dr. Vicenta Robb within 1 week of discharge    Signed:  Yamilet Reyes DPM PGY-2  3/17/2022  7:30 AM

## 2022-03-17 NOTE — PROGRESS NOTES
Progress note: Infectious diseases    Patient - Faustina Griffin,  Age - 67 y.o.    - 1945      Room Number - 6E-57/057-A   MRN -  [id number]   Acct # - [de-identified]  Date of Admission -  3/14/2022  7:56 PM    SUBJECTIVE:   No new issues. Plan for discharge per podiatry  OBJECTIVE   VITALS    height is 5' 5\" (1.651 m) and weight is 203 lb 0.7 oz (92.1 kg). Her axillary temperature is 97.3 °F (36.3 °C). Her blood pressure is 131/62 and her pulse is 71. Her respiration is 16 and oxygen saturation is 97%. Wt Readings from Last 3 Encounters:   03/15/22 203 lb 0.7 oz (92.1 kg)   21 198 lb (89.8 kg)       I/O (24 Hours)    Intake/Output Summary (Last 24 hours) at 3/17/2022 0845  Last data filed at 3/17/2022 5613  Gross per 24 hour   Intake 2332.28 ml   Output 2150 ml   Net 182.28 ml       General Appearance  Awake, alert, oriented,   HEENT - normocephalic, atraumatic,slighlty pale  conjunctiva,  anicteric sclera  Neck - Supple, no mass  Lungs -  Bilateral   air entry, no rhonchi, no wheeze  Cardiovascular - Heart sounds are normal.    Abdomen - soft, not distended, nontender,   Neurologic -oriented  Skin - No bruising or bleeding  Extremities - + edema,dressed left foot wound.     MEDICATIONS:      enoxaparin  30 mg SubCUTAneous Daily    cefTRIAXone (ROCEPHIN) IV  1,000 mg IntraVENous Q24H    sodium chloride flush  5-40 mL IntraVENous 2 times per day    insulin lispro  0-12 Units SubCUTAneous TID WC    insulin lispro  0-6 Units SubCUTAneous Nightly    potassium chloride  10 mEq Oral Daily    bumetanide  1 mg Oral Daily    lisinopril  20 mg Oral Daily    gabapentin  400 mg Oral TID    atorvastatin  20 mg Oral Daily    pantoprazole  40 mg Oral QAM AC    oxyCODONE-acetaminophen  1 tablet Oral BID    DULoxetine  30 mg Oral Daily    vitamin B-12  1,000 mcg Oral Daily      sodium chloride 20 mL/hr at 03/17/22 0606    sodium chloride      dextrose       sodium chloride flush, sodium chloride, ondansetron **OR** ondansetron, polyethylene glycol, acetaminophen **OR** acetaminophen, glucagon (rDNA), dextrose, ALPRAZolam, hydrOXYzine, glucose, dextrose bolus (hypoglycemia)      LABS:     CBC:   Recent Labs     03/15/22  0624 03/16/22  0548 03/17/22  0808   WBC 7.0 7.1 7.6   HGB 8.8* 8.8* 10.4*    238 288     BMP:    Recent Labs     03/15/22  1043 03/16/22  0548   NA  --  143   K  --  4.6   CL  --  108   CO2  --  22*   BUN  --  33*   CREATININE 1.7* 1.8*   GLUCOSE  --  112*     Calcium:  Recent Labs     03/16/22  0548   CALCIUM 8.6      Recent Labs     03/16/22  1741 03/16/22  1952 03/17/22  0802   POCGLU 151* 165* 109*        CULTURES:   UA: No results for input(s): Neil Shane, COLORU, CLARITYU, MUCUS, PROTEINU, BLOODU, RBCUA, WBCUA, BACTERIA, NITRU, GLUCOSEU, BILIRUBINUR, UROBILINOGEN, KETUA, LABCAST, LABCASTTY, AMORPHOS in the last 72 hours. Invalid input(s): CRYSTALS  Micro: No results found for: BC       Problem list of patient:     Patient Active Problem List   Diagnosis Code    Cellulitis L03.90    Chronic kidney disease N18.9    Diabetes mellitus ([address] E11.9    Hypertension I10    Acute osteomyelitis of toe of left foot ([address] M86.172    Heel ulcer due to secondary DM ([address] E13.621, L97.409    Osteomyelitis of foot, right, acute ([address] M86.171    Osteomyelitis of foot, left, acute ([address] M86.172         ASSESSMENT/PLAN   Left foot diabetic ulcer:    CKD  DM with neuropathy  Ok with discharge with plan  Clindamycin e-scribed to her pharmacy.     Ignacio Broderick MD, MD, FACP 3/17/2022 8:45 AM

## 2022-03-17 NOTE — PLAN OF CARE
Problem: Pain:  Goal: Pain level will decrease  Description: Pain level will decrease  3/16/2022 2257 by Lily Arellano RN  Outcome: Ongoing  Note: Patient rating chronic generalized pain 7/10. Controlled to pain goal of 5/10 with percocet and repositioning. Problem: Falls - Risk of:  Goal: Will remain free from falls  Description: Will remain free from falls  3/16/2022 2257 by Lily Arellano RN  Outcome: Ongoing  Note: Patient free from falls this shift. Gait steady with 1 assist/walker. Alert and oriented x 4, using call light appropriately. 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 No pain, warmth or redness to calves. Lovenox in use. Problem: Skin Integrity/Risk  Goal: No skin breakdown during hospitalization  3/16/2022 2257 by Lily Arellano RN  Outcome: Ongoing  Note: Diabetic ulcer to left heel-dressing in place with betadine. Scabbed area to right shin-intact. Problem: Discharge Planning:  Goal: Patients continuum of care needs are met  Description: Patients continuum of care needs are met  3/16/2022 2257 by Lily Arellano RN  Outcome: Ongoing  Note: Patient anticipates being discharged home with family support. Denies needs at this time. Care plan reviewed with patient. Patient verbalized understanding of the plan of care and contribute to goal setting.

## 2022-03-17 NOTE — PROGRESS NOTES
Discharge instructions reviewed with patient. Questions answered and she stated understanding. PT recommended outpatient therapy for patient, but patient refuses. Patient was given AVS, dressing supplies, CHG soap, prevalon boots, and home medications were returned.

## 2022-03-17 NOTE — CARE COORDINATION
3/17/22, 11:07 AM EDT    Patient goals/plan/ treatment preferences discussed by  and . Patient goals/plan/ treatment preferences reviewed with patient/ family. Patient/ family verbalize understanding of discharge plan and are in agreement with goal/plan/treatment preferences. Understanding was demonstrated using the teach back method. AVS provided by RN at time of discharge, which includes all necessary medical information pertaining to the patients current course of illness, treatment, post-discharge goals of care, and treatment preferences. IMM Letter  IMM Letter given to Patient/Family/Significant other/Guardian/POA/by[de-identified]   IMM Letter date given[de-identified] 03/17/22  IMM Letter time given[de-identified] 0577     Pt discharged to home with daughter. She denies needs or services.

## 2022-03-17 NOTE — PROGRESS NOTES
[address] [address]  INPATIENT PHYSICAL THERAPY  EVALUATION  [facility] [place] [facility] [place] - 6E-57/057-A    Time In: 8434  Time Out: 6301  Timed Code Treatment Minutes: 27 Minutes  Minutes: 37        Date: 3/17/2022  Patient Name: Dario Bang,  Gender:  female        MRN: [id number]  : 1945  (67 y.o.)      Referring Practitioner: Elizabeth Ignacio DPM  Diagnosis: Heel ulcer due to secondary DM  Additional Pertinent Hx: Per H&P 3/14: The patient is a 67 y.o. female with significant past medical history of diabetes, hypertension, CKD who was seen by Dr. Nandini Solis today in clinic. Patient relates that she has had a wound on her left heel but does not know how long it has been there. She has been neuropathic for a very long time. Patient does have some discomfort to the left leg. She denies being on any blood thinners but does sit often. They had discussed with Dr. Nandini Solis in clinic that she has infection in her bone and the best course of action would be admission for further workup. Restrictions/Precautions:  Restrictions/Precautions: General Precautions,Weight Bearing,Fall Risk  Left Lower Extremity Weight Bearing: Weight Bearing As Tolerated  Required Braces or Orthoses  Left Lower Extremity Brace:  (surgical shoe)    Subjective:  Chart Reviewed: Yes  Patient assessed for rehabilitation services?: Yes  Family / Caregiver Present: No  Subjective: RN approved session, pt agreeable for PT session. Time during session to discuss home safety with pt recommending 24/7 assistance from her daughter, use of RW, and continued PT. Pt does not seem receptive to continued PT. Time to discuss safety with ambulation with upcoming L UE carpal tunnel surgery. Discussed talking with her doctor about restricions, use of RW, and possible need for platform walker with this. Pt declines practicing stairs this date, states no concern with home entry.  Pt education to have daughter assist with stairs with use of gait belt iniitally. General:  Overall Orientation Status: Within Functional Limits (not formally assessed)  Follows Commands: Within Functional Limits    Vision: Impaired  Vision Exceptions: Wears glasses for reading    Hearing: Exceptions to [facility]  Hearing Exceptions: Hard of hearing/hearing concerns       Pain: unrated. pt states L carpal tunnel, B shoulder problems, back pain, and L knee pain. All chronic. Vitals: Oxygen: 94% following mobility  Heart Rate: 99 following mobility    Social/Functional History:    Lives With: Daughter  Type of Home: House  Home Layout: Two level,Able to Live on Main level with bedroom/bathroom  Home Access: Stairs to enter with rails  Entrance Stairs - Number of Steps: 4  Entrance Stairs - Rails: Left  Home Equipment: Rolling walker,4 wheeled walker        Receives Help From: Family        Ambulation Assistance: Independent  Transfer Assistance: Independent    Active : Yes     Additional Comments: Pt ambulates outside of the home with RW, inside with 4ww. Pt's daughter assists with driving, cooking, and laundry. Pt uses RW on stairs. OBJECTIVE:  Range of Motion:  Right Lower Extremity: WFL  Left Lower Extremity: Impaired - limited d/t dressing at L ankle    Strength:  Right Lower Extremity: Hip flexion 4/5, knee extension 4+/5, knee flexion 4/5, DF 4+/5, PF 4+/5    Left Lower Extremity: Hip flexion 4/5, knee extension 4+/5, knee flexion 4/5, DF 4/5, PF 4+/5    Balance:  Dynamic Sitting Balance: Stand By Assistance  Static Standing Balance: Contact Guard Assistance  Dynamic Standing Balance: Contact Guard Assistance, Minimal Assistance   Min assist provided with the Tinetti.      Bed Mobility:  Not Tested    Transfers:  Sit to Stand: Stand By Assistance, [employer], with increased time for completion, cues for hand placement  Stand to Sit:Stand By Assistance, [employer], with increased time for completion, cues for hand placement   *Pt completed transfers slowly, cues required for 2 out of the 3 transfers for safe hand placement   *Pt improved to required close SBA with last transfer    Ambulation:  [address], with verbal cues , with increased time for completion  Distance: ~18'  Surface: Level Tile  Device:Rolling Walker  Gait Deviations: Forward Flexed Posture, Slow Bijal, Decreased Step Length Bilaterally, Decreased Heel Strike Bilaterally, Wide Base of Support, Mild Path Deviations, Unsteady Gait and Decreased Terminal Knee Extension  *Pt noted to ambulate slowly with increased B UE support on RW. Pt noted to have difficulty with force production with swing phase on L LE. No heel strike or toe off noted, with increased force production at the hip and knee flexors to clear the LE. Pt noted to step quickly with L LE, noted to have deficits in slow eccentric lowering of L LE, with audible foot placement on the floor. *Cues for improved posture, increased attention to L LE swing phase, with cues to place foot \"quietly\" with min improvement in controlled lowering. *Education provided on having assistance from her daughter with ambulation, ambulating for short distances, and to take rest breaks as needed as this gait pattern is noted to require increased energy and increase risk of fall. Exercise:  None completed    Functional Outcome Measures: Completed  Balance Score: 8  Gait Score: 4  Tinetti Total Score: 12/28 with high fall risk  -PAC Inpatient Mobility Raw Score : 17  AM-PAC Inpatient T-Scale Score : 42.13     Risk Indicators:  Less than/equal to 18 = high risk  19-23 Moderate risk  Greater than/equal to 24 = low risk    ASSESSMENT:  Activity Tolerance:  Patient tolerance of  treatment: good. Pt tolerated mobility well, however noted to have some deficits in safety awareness, insight, and significant gait deviations. Pt with limited receiveness to continued PT.        Treatment Initiated: Treatment and education initiated within context of evaluation. Evaluation time included review of current medical information, gathering information related to past medical, social and functional history, completion of standardized testing, formal and informal observation of tasks, assessment of data and development of plan of care and goals. Treatment time included skilled education and facilitation of tasks to increase safety and independence with functional mobility for improved independence and quality of life. Assessment: Body structures, Functions, Activity limitations: Decreased functional mobility ,Decreased balance,Decreased posture,Decreased strength,Decreased safe awareness,Decreased endurance  Assessment: This patient is a 67 y.o. who presents with heel ulcer. This is a decline from the patient's baseline status of mod I with use of RW and living with her daughter. Pt scored a 12/18 on the Tinetti indicating a high fall risk. The patient is observed to have deficits in strength, balance, activity tolerance, and safety awareness and would benefit from skilled PT services to progress functional mobility, safety awareness, and to decrease overall risk of falls. Pt would benefit from increased assistance with mobility from her daughter, continued use of RW and continued PT.      Prognosis: Fair    REQUIRES PT FOLLOW UP: Yes    Discharge Recommendations:  Discharge Recommendations: 24 hour supervision or assist,Patient would benefit from continued therapy after discharge    Patient Education:  PT Education: Roel Savage of [address] Mobility Training    Equipment Recommendations:  Equipment Needed: No (continue to assess needs)    Plan:  Times per week: 5x GM  Current Treatment Recommendations: Strengthening,Neuromuscular Re-education,Home Exercise Program,Safety Education & Svitlana Sis Training,Patient/Caregiver Education & Training,Functional Mobility Training,Gait Training,Transfer Training,Stair training    Goals:  Patient goals : pt states she does not think she could get away from using the walker, which is her goal  Short term goals  Time Frame for Short term goals: by hospital d/c  Short term goal 1: Pt to demo supine <->sit mod I for ability to get in and out of bed eaasily  Short term goal 2: Pt to demo sit <->stand with S for ability to transfer from various surfaces safely  Short term goal 3: Pt to ambulate >=40' with LRAD and SBA with improved control with swing phase on L LE for improved safety with mobility  Short term goal 4: Pt to ascend/descend 4 steps with CGA and RW for ease with home entry  Short term goal 5: Pt to progress Tinetti score to >=19/28 to progress to the moderate fall risk category  Long term goals  Time Frame for Long term goals : NA due to short ELOS    Following session, patient left in safe position with all fall risk precautions in place.

## (undated) DEVICE — PACK-MAJOR

## (undated) DEVICE — BASIC SINGLE BASIN BTC-LF: Brand: MEDLINE INDUSTRIES, INC.

## (undated) DEVICE — BANDAGE COMPR E SGL LAYERED CLSR BGE W/ CLP W4INXL15FT

## (undated) DEVICE — GOWN,SIRUS,NONRNF,SETINSLV,XL,20/CS: Brand: MEDLINE

## (undated) DEVICE — DRAPE,EXTREMITY,89X128,STERILE: Brand: MEDLINE

## (undated) DEVICE — THIN OFFSET (9.0 X 0.38 X 25.0MM)

## (undated) DEVICE — SOLUTION SURG PREP POV IOD 7.5% 4 OZ

## (undated) DEVICE — PACK PROCEDURE SURG SET UP SRMC

## (undated) DEVICE — INTENDED FOR TISSUE SEPARATION, AND OTHER PROCEDURES THAT REQUIRE A SHARP SURGICAL BLADE TO PUNCTURE OR CUT.: Brand: BARD-PARKER ® CARBON RIB-BACK BLADES

## (undated) DEVICE — SYRINGE IRRIG 60ML SFT PLIABLE BLB EZ TO GRP 1 HND USE W/

## (undated) DEVICE — GLOVE ORANGE PI 7   MSG9070

## (undated) DEVICE — SUTURE MCRYL + SZ 3-0 L27IN ABSRB UD PS1 L24MM 3/8 CIR REV MCP936H

## (undated) DEVICE — SUTURE VCRL + SZ 4-0 L27IN ABSRB WHT FS-2 3/8 CIR REV CUT VCP422H

## (undated) DEVICE — SYRINGE MED 10ML LUERLOCK TIP W/O SFTY DISP

## (undated) DEVICE — 4.0MM EGG

## (undated) DEVICE — HYPODERMIC SAFETY NEEDLE: Brand: MAGELLAN

## (undated) DEVICE — BANDAGE,GAUZE,4.5"X4.1YD,STERILE,LF: Brand: MEDLINE

## (undated) DEVICE — GLOVE ORANGE PI 7 1/2   MSG9075

## (undated) DEVICE — PREP SOL PVP IODINE 4%  4 OZ/BTL

## (undated) DEVICE — BLADE SAW OSCIL SAG MED 5.5X18MM

## (undated) DEVICE — SPONGE GZ W4XL4IN COT 12 PLY TYP VII WVN C FLD DSGN

## (undated) DEVICE — IMPREGNATED GAUZE DRESSING: Brand: CUTICERIN 7.5X7.5CM CTN 50

## (undated) DEVICE — GLOVE SURG SZ 65 THK91MIL LTX FREE SYN POLYISOPRENE

## (undated) DEVICE — SUTURE ETHLN SZ 3-0 L18IN NONABSORBABLE BLK FS-1 L24MM 3/8 663H

## (undated) DEVICE — Z DISCONTINUED BY MEDLINE USE 2711682 TRAY SKIN PREP DRY W/ PREM GLV

## (undated) DEVICE — GOWN,SIRUS,NON REINFRCD,LARGE,SET IN SL: Brand: MEDLINE

## (undated) DEVICE — DRAPE,U/SHT,SPLIT,FILM,60X84,STERILE: Brand: MEDLINE

## (undated) DEVICE — GOWN,AURORA,NON-REINFORCED,2XL: Brand: MEDLINE

## (undated) DEVICE — GLOVE ORANGE PI 8   MSG9080

## (undated) DEVICE — GAUZE,SPONGE,8"X4",12PLY,XRAY,STRL,LF: Brand: MEDLINE

## (undated) DEVICE — BANDAGE COMPR W4INXL12FT E DISP ESMARCH EVEN

## (undated) DEVICE — PADDING,UNDERCAST,COTTON, 4"X4YD STERILE: Brand: MEDLINE